# Patient Record
Sex: FEMALE | Race: WHITE | NOT HISPANIC OR LATINO | Employment: FULL TIME | URBAN - METROPOLITAN AREA
[De-identification: names, ages, dates, MRNs, and addresses within clinical notes are randomized per-mention and may not be internally consistent; named-entity substitution may affect disease eponyms.]

---

## 2023-10-10 ENCOUNTER — TRANSFERRED RECORDS (OUTPATIENT)
Dept: HEALTH INFORMATION MANAGEMENT | Facility: CLINIC | Age: 48
End: 2023-10-10

## 2023-10-22 ENCOUNTER — HEALTH MAINTENANCE LETTER (OUTPATIENT)
Age: 48
End: 2023-10-22

## 2023-11-15 ENCOUNTER — TELEPHONE (OUTPATIENT)
Dept: TRANSPLANT | Facility: CLINIC | Age: 48
End: 2023-11-15
Payer: COMMERCIAL

## 2023-11-15 NOTE — TELEPHONE ENCOUNTER
Left Voicemail (1st Attempt) for the patient to call back and schedule the following:    Appointment type: K/P EVAL RTN SURG  Provider: NYA BRANDT  Return date: 1/22/24  Specialty phone number: [phone]  Additional appointment(s) needed: NA  Additonal Notes: can reschedule appt to be with Dr. Brandt on 1/22/24, per RN.

## 2024-01-03 ENCOUNTER — TELEPHONE (OUTPATIENT)
Dept: TRANSPLANT | Facility: CLINIC | Age: 49
End: 2024-01-03
Payer: COMMERCIAL

## 2024-01-18 NOTE — PROGRESS NOTES
Transplant Surgery      Reason For Visit: Kidney transplant mini-eval    History of Present Illness:  Patient diagnosed with ESRD last year.   Unclear what the cause of renal disease as the biopsy results are not readily available.  PD catheter placed last  and started PD Oct 2023.    Non diabetic    BMI last recorded was 56 in records.       Patient has an elevated BMI, has lost 40 lb on ozempic    Exam:   LMP 2024 (Approximate)   Overweight female, no apparent distress    Impression:  Needs weight loss.  Discussed rationale for weight limits and BMI targets in detail with patient and family.  On PD so doesn't need to be listed now.    Plan: Weight loss to BMI < 40 for full eval.  Likely 35 for transplant.  Patient has potential LD.    Ms. Ko appears to be a good candidate for kidney transplantation and has a good understanding of the risks and benefits of this approach to the management of renal failure. The following issues should be addressed prior to finalizing her transplant candidacy:     Weight loss  Complete remainder of workup/eval when achieves BMI ~ 38  Eval donors when patient comes for full eval.    The majority of our visit was spent in counselling, discussing the medical and surgical risks of kidney transplantation. We discussed approximate wait time and how that is influenced by issues such as blood type and sensitization (PRA) and access to a living donor. I contrasted potential waiting time for living vs  donor kidneys from  normal (0-85%) or higher (%) kidney donor profile index (KDPI) donors and their associated outcomes. I would not recommend this individual to consider kidneys from high KDPI donors. Potential surgical complications of kidney transplantation include bleeding, superficial or deep wound complications (infection, hernia, lymphocele), ureteral anastomotic failure (leak or stenosis), graft thrombosis, need for reoperation and other issues such as cardiac  complications, pneumonia, deep venous thrombosis, pulmonary embolism, post transplant diabetes and death. The potential for recurrent disease or need for retransplantation was also addressed. We discussed the possible need for ureteral stent (and subsequent removal), and the utility of protocol biopsy and laboratory studies to evaluate for rejection or recurrent disease. We discussed the risk of graft rejection, our center's average graft and patient survival rates, immunosuppression protocols, as well as the potential opportunity to participate in clinical trials.  We also discussed the average length of stay, recovery process, and posttransplant lab and monitoring protocol.  I emphasized the need for strict immunosuppression medication adherence and the potential for complications of immunosuppression such as skin cancer or lymphoma, as well as a very low but not zero risk of donor-derived disease transmission risks (infection, cancer). Ms. Ko asked good questions and her candidacy will be reviewed at our Multidisciplinary Selection Committee. Thank you for the opportunity to participate in Ms. Ko's care.      Frank Lynn MD, PhD  Transplant Surgery

## 2024-01-22 ENCOUNTER — OFFICE VISIT (OUTPATIENT)
Dept: TRANSPLANT | Facility: CLINIC | Age: 49
End: 2024-01-22
Attending: NURSE PRACTITIONER
Payer: COMMERCIAL

## 2024-01-22 VITALS
HEART RATE: 109 BPM | WEIGHT: 293 LBS | SYSTOLIC BLOOD PRESSURE: 123 MMHG | BODY MASS INDEX: 51.91 KG/M2 | OXYGEN SATURATION: 97 % | DIASTOLIC BLOOD PRESSURE: 76 MMHG | HEIGHT: 63 IN

## 2024-01-22 DIAGNOSIS — N18.5 CHRONIC RENAL FAILURE, STAGE 5 (H): ICD-10-CM

## 2024-01-22 DIAGNOSIS — Z76.82 ORGAN TRANSPLANT CANDIDATE: ICD-10-CM

## 2024-01-22 DIAGNOSIS — I10 ESSENTIAL HYPERTENSION: ICD-10-CM

## 2024-01-22 DIAGNOSIS — Z99.2 STAGE 5 CHRONIC KIDNEY DISEASE ON CHRONIC DIALYSIS (H): ICD-10-CM

## 2024-01-22 DIAGNOSIS — Z01.818 PRE-TRANSPLANT EVALUATION FOR KIDNEY TRANSPLANT: ICD-10-CM

## 2024-01-22 DIAGNOSIS — E78.5 HYPERLIPIDEMIA, UNSPECIFIED HYPERLIPIDEMIA TYPE: ICD-10-CM

## 2024-01-22 DIAGNOSIS — G47.33 OBSTRUCTIVE SLEEP APNEA: ICD-10-CM

## 2024-01-22 DIAGNOSIS — N18.6 END STAGE RENAL DISEASE (H): ICD-10-CM

## 2024-01-22 DIAGNOSIS — N18.9 CHRONIC RENAL FAILURE: ICD-10-CM

## 2024-01-22 DIAGNOSIS — N18.6 STAGE 5 CHRONIC KIDNEY DISEASE ON CHRONIC DIALYSIS (H): ICD-10-CM

## 2024-01-22 DIAGNOSIS — E78.5 HYPERLIPIDEMIA: ICD-10-CM

## 2024-01-22 PROCEDURE — 99213 OFFICE O/P EST LOW 20 MIN: CPT | Performed by: PHYSICIAN ASSISTANT

## 2024-01-22 PROCEDURE — 99202 OFFICE O/P NEW SF 15 MIN: CPT | Performed by: PHYSICIAN ASSISTANT

## 2024-01-22 PROCEDURE — 99204 OFFICE O/P NEW MOD 45 MIN: CPT | Performed by: TRANSPLANT SURGERY

## 2024-01-22 RX ORDER — ASPIRIN 81 MG/1
81 TABLET ORAL DAILY
COMMUNITY

## 2024-01-22 RX ORDER — CARVEDILOL 6.25 MG/1
6.25 TABLET ORAL 2 TIMES DAILY WITH MEALS
COMMUNITY
Start: 2023-09-08

## 2024-01-22 RX ORDER — TORSEMIDE 20 MG/1
20 TABLET ORAL DAILY
COMMUNITY
Start: 2023-09-08

## 2024-01-22 RX ORDER — CALCIUM CARBONATE/VITAMIN D3 500MG-5MCG
1 TABLET ORAL DAILY
COMMUNITY
Start: 2023-09-23

## 2024-01-22 RX ORDER — FOLIC ACID/VIT B COMPLEX AND C 0.8 MG
1 TABLET ORAL
COMMUNITY
Start: 2023-09-08

## 2024-01-22 RX ORDER — ERGOCALCIFEROL 1.25 MG/1
50000 CAPSULE, LIQUID FILLED ORAL WEEKLY
COMMUNITY
Start: 2023-09-22

## 2024-01-22 RX ORDER — PANTOPRAZOLE SODIUM 40 MG/1
40 TABLET, DELAYED RELEASE ORAL DAILY
COMMUNITY
Start: 2023-09-08

## 2024-01-22 RX ORDER — PREDNISONE 20 MG/1
20 TABLET ORAL
COMMUNITY
Start: 2023-12-28

## 2024-01-22 RX ORDER — LEVOTHYROXINE SODIUM 125 UG/1
TABLET ORAL
COMMUNITY
Start: 2023-09-08

## 2024-01-22 NOTE — Clinical Note
RD 1 month video Dr Vasquez in 1-2 months video to discuss sleeve gastrectomy if interested in surgery Keli 3-4 months return MWM

## 2024-01-22 NOTE — LETTER
1/22/2024         RE: Елена Ko  [address] [place] [id number]        Dear Colleague,    Thank you for referring your patient, Елена Ko, to the [facility] TRANSPLANT [facility]. Please see a copy of my visit note below.    [facility] SOLID ORGAN TRANSPLANT  OUTPATIENT MNT: KIDNEY TRANSPLANT EVALUATION    Current BMI: 54 (HT 63 in,  lbs/140 kg)  BMI guideline for kidney transplant up to a BMI of 40 / per surgeon discretion      TIME SPENT: 30 minutes  VISIT TYPE: Initial  REFERRING PHYSICIAN: Abundio Rodriguez DO   PT ACCOMPANIED BY:  and sister-in-law    History of previous txp: No  Dialysis: Yes, PD    NUTRITION ASSESSMENT    This RD visit can be counted as 1st required RD visit for bariatric surgery/weight management if pt were to proceed with this program    - Appetite: Poor, taking Ozempic started in October, was hospital in September, appetite has been poor for a long time. Pt reports she has been losing her hair recently.  - Food allergies/intolerances: None, although reports she doesn't tolerate whey/soy in protein drinks.  - Meal prep & grocery shopping:  does, patient can't go through grocery store d/t fatiguing  - Previous RD education: 15 years ago, for thyroid issues while pregnant  - Issues chewing or swallowing: None  - N/V/D/C: Nausea with sugary foods/beverages, now when she hasn't eaten for a while as well  - Food access concerns: None    Vitamins, Supplements, Pertinent Meds: Nephrovite, Vitamin D  Herbal Medicines/Supplements: None    Edema: Feet, ankles, fluid, right side is worse    Weight hx:   Wt Readings from Last 15 Encounters:   01/22/24 139.9 kg (308 lb 6.4 oz)   10/13/23 147 kg (324 lb 1.2 oz)   5% wt loss in 3 months. Was 154 kg previously, so has lost a total of 14 kg (~31 lbs).    PHYSICAL ACTIVITY  Low energy- anemia, getting hgb back up, gets tired more easily. Works from home, gets up every hour and walks around house a  bit. Also has dogs and walks with them in summer for a couple hours (doesn't do in the winter)     DIET RECALL  Breakfast Skips   Lunch [place] (ham) or chicken tenders (3)   Dinner If she has something, it will be something small usually- bites of the meal her family is having, otherwise skips dinner   Snacks Fruit or vegetables, carrots, potatoes   Beverages Mostly just water, lemonade/crystal light, apple juice, cranberry juice (but juices sometimes causes nausea so hasn't been doing as often)   Alcohol None   Dining out Sometimes, usually takes half home, or kids meal (1/2 of that)     LABS  Na 134 (L, 9/11/23)  K 4.3 (WNL) -> pt reports lately has had low K  BUN 47 (H), Cr 8.2 (H)    NUTRITION DIAGNOSIS  Obesity r/t positive energy balance and inadequate physical activity AEB BMI 54.    NUTRITION INTERVENTION  Nutrition education provided:  - With poor appetite and goal of weight loss, encouraged adequate protein to maintain muscle mass while losing weight.  - Encouraged using a plant-based protein powder in things like oatmeal, smoothies, or mixed with almond milk/water (pt reports not tolerating whey/soy)  - Encouraged adding a couple of high-protein snacks/meals into daily intake. Discussed protein-containing foods for snack ideas.  - Discussed sodium intake (low sodium foods and drinks, seasoning food without salt and tips for low sodium diet). However also discussed that pt is likely not eating too much sodium currently d/t low intakes.    Patient Understanding: Pt verbalized understanding of education provided.  Expected Engagement: Good  Follow-Up Plans: PRN     NUTRITION GOALS  Add in at least 2 high protein snacks or meals/day to maintain muscle mass     Sharmila Vincent, MPS, RD, LD                                    Again, thank you for allowing me to participate in the care of your patient.        Sincerely,        Bambi Vogt RD

## 2024-01-22 NOTE — LETTER
2024         RE: Елена Ko  [address] [place] [id number]        Dear Colleague,    Thank you for referring your patient, Елена Ko, to the [facility] TRANSPLANT [facility]. Please see a copy of my visit note below.      Transplant Surgery      Reason For Visit: Kidney transplant mini-eval    History of Present Illness:  Patient diagnosed with ESRD last year.   Unclear what the cause of renal disease as the biopsy results are not readily available.  PD catheter placed last  and started PD Oct 2023.    Non diabetic    BMI last recorded was 56 in records.       Patient has an elevated BMI, has lost 40 lb on ozempic    Exam:   LMP 2024 (Approximate)   Overweight female, no apparent distress    Impression:  Needs weight loss.  Discussed rationale for weight limits and BMI targets in detail with patient and family.  On PD so doesn't need to be listed now.    Plan: Weight loss to BMI < 40 for full eval.  Likely 35 for transplant.  Patient has potential LD.    Ms. Ko appears to be a good candidate for kidney transplantation and has a good understanding of the risks and benefits of this approach to the management of renal failure. The following issues should be addressed prior to finalizing her transplant candidacy:     Weight loss  Complete remainder of workup/eval when achieves BMI ~ 38  Eval donors when patient comes for full eval.    The majority of our visit was spent in counselling, discussing the medical and surgical risks of kidney transplantation. We discussed approximate wait time and how that is influenced by issues such as blood type and sensitization (PRA) and access to a living donor. I contrasted potential waiting time for living vs  donor kidneys from  normal (0-85%) or higher (%) kidney donor profile index (KDPI) donors and their associated outcomes. I would not recommend this individual to consider kidneys from high KDPI donors. Potential  surgical complications of kidney transplantation include bleeding, superficial or deep wound complications (infection, hernia, lymphocele), ureteral anastomotic failure (leak or stenosis), graft thrombosis, need for reoperation and other issues such as cardiac complications, pneumonia, deep venous thrombosis, pulmonary embolism, post transplant diabetes and death. The potential for recurrent disease or need for retransplantation was also addressed. We discussed the possible need for ureteral stent (and subsequent removal), and the utility of protocol biopsy and laboratory studies to evaluate for rejection or recurrent disease. We discussed the risk of graft rejection, our center's average graft and patient survival rates, immunosuppression protocols, as well as the potential opportunity to participate in clinical trials.  We also discussed the average length of stay, recovery process, and posttransplant lab and monitoring protocol.  I emphasized the need for strict immunosuppression medication adherence and the potential for complications of immunosuppression such as skin cancer or lymphoma, as well as a very low but not zero risk of donor-derived disease transmission risks (infection, cancer). Ms. Ko asked good questions and her candidacy will be reviewed at our Multidisciplinary Selection Committee. Thank you for the opportunity to participate in Ms. Ko's care.      Frank Lynn MD, PhD  Transplant Surgery          Again, thank you for allowing me to participate in the care of your patient.        Sincerely,        Frank Lynn MD

## 2024-01-22 NOTE — PROGRESS NOTES
"20 minutes spent by me on the date of the encounter doing chart review, history and exam, documentation and further activities per the note    New Weight Management Consultation Note    2024    RE: Елена Ko  MR#: [id number]  : 1975      Referring provider:       Chief Complaint/Reason for visit: obesity, weight loss prior to kidney transplant    Dear Saira Long MD (General),    I had the pleasure of seeing your patient, Елена Ko, to evaluate her obesity and discussed weight management treatment options.  As you know, Елена oK is 49 year old.  She has a height of 5' 3.25\", a weight of 308 lbs 6.4 oz, and calculated Body mass index is 54.2 kg/m ..  Full intake/assessment was done to determine barriers to weight loss success and develop a treatment plan. Lost from 338 to 300 lbs. Since August. Taking Ozempic 0.5mg weekly, planning to increase to 1mg if covered in . No Type DM.  May need to switch to Zepbound    Assessment & Plan   Problem List Items Addressed This Visit    None  Visit Diagnoses       Pre-transplant evaluation for kidney transplant        End stage renal disease (H)        Organ transplant candidate        Essential hypertension        Hyperlipidemia, unspecified hyperlipidemia type        Obstructive sleep apnea               Weight mgmt consult  ESRD on dialysis since Aug 2023  Needs BMI <40, ideally less than 35 for kidney transplant.    Lost from 338 to 300 lbs. Since August.   Taking Ozempic 0.5mg weekly, planning to increase to 1mg if covered in . No Type DM.    May need to switch to Zepbound    Follow up   RD first available  Keli 3-4 months      HISTORY OF PRESENT ILLNESS:    ESRD on dialysis since 2023. ESRD came on suddenly.  ? autoimmune cause.  90% scar tissue on biopsy.  Rheumatology: working on autoimmune work up.     Nephrologist: Dr Abundio VACA  Dialysis at [facility]. PD since Oct.     Diabetes: No " diabetes    GERD: Protonix     Interested in weight loss surgery: Possibly    CO-MORBIDITIES OF OBESITY INCLUDE:    Is patient on biologics or immunomodulators? No    PAST MEDICAL HISTORY:  Past Medical History:   Diagnosis Date    Arthritis     Hypertension     Thyroid disease        PAST SURGICAL HISTORY:  Past Surgical History:   Procedure Laterality Date    BIOPSY      Kidney August 2023    ENT SURGERY      deviated septum 2013    GYN SURGERY      D&C x 2    ORTHOPEDIC SURGERY      right ankle surgery 2013       SOCIAL HISTORY:     Works as , 4 teenagers    HABITS:  No alcohol  No smoking  Currently taking narcotic/opioids No    PSYCHOLOGICAL HISTORY:   No past psych history      EATING BEHAVIORS:  No dx of ED    EXERCISE:  Walking outside    MEDICATIONS:  Current Outpatient Medications   Medication Sig Dispense Refill    aspirin 81 MG EC tablet Take 81 mg by mouth daily      Calcium Carb-Cholecalciferol (OYSCO 500 + D) 500-5 MG-MCG TABS Take 1 tablet by mouth daily      carvedilol (COREG) 6.25 MG tablet Take 6.25 mg by mouth 2 times daily (with meals)      Fexofenadine HCl (ALLEGRA ALLERGY PO)       levothyroxine (SYNTHROID/LEVOTHROID) 125 MCG tablet levothyroxine 125 mcg      multivitamin RENAL (NEPHROVITE) 0.8 MG tablet Take 1 tablet by mouth      NIFEdipine ER (ADALAT CC) 60 MG 24 hr tablet Take 60 mg by mouth daily      pantoprazole (PROTONIX) 40 MG EC tablet Take 40 mg by mouth daily      predniSONE (DELTASONE) 20 MG tablet Take 20 mg by mouth once as needed      Semaglutide, 1 MG/DOSE, (OZEMPIC) 4 MG/3ML pen Inject 1 mg Subcutaneous every 7 days      torsemide (DEMADEX) 20 MG tablet Take 20 mg by mouth daily      vitamin D2 (ERGOCALCIFEROL) 00386 units (1250 mcg) capsule Take 50,000 Units by mouth once a week         ALLERGIES:    PHYSICAL EXAM:  Objective    Physical Exam   GENERAL: alert and no distress  EYES: Eyes grossly normal to inspection.  No discharge or erythema, or obvious  scleral/conjunctival abnormalities.  RESP: No audible wheeze, cough, or visible cyanosis.    SKIN: Visible skin clear. No significant rash, abnormal pigmentation or lesions.  NEURO: Cranial nerves grossly intact.  Mentation and speech appropriate for age.  PSYCH: Appropriate affect, tone, and pace of words        Sincerely,     Keli Gibson PA-C

## 2024-01-22 NOTE — LETTER
"    2024         RE: Елена Ko  [address] [place] [id number]        Dear Colleague,    Thank you for referring your patient, Елена Ko, to the [facility] TRANSPLANT [facility]INIC. Please see a copy of my visit note below.    20 minutes spent by me on the date of the encounter doing chart review, history and exam, documentation and further activities per the note    New Weight Management Consultation Note    2024    RE: Елена Ko  MR#: [id number]  : 1975      Referring provider:       Chief Complaint/Reason for visit: obesity, weight loss prior to kidney transplant    Dear Saira Long MD (General),    I had the pleasure of seeing your patient, Елена Ko, to evaluate her obesity and discussed weight management treatment options.  As you know, Елена Ko is 49 year old.  She has a height of 5' 3.25\", a weight of 308 lbs 6.4 oz, and calculated Body mass index is 54.2 kg/m ..  Full intake/assessment was done to determine barriers to weight loss success and develop a treatment plan. Lost from 338 to 300 lbs. Since August. Taking Ozempic 0.5mg weekly, planning to increase to 1mg if covered in . No Type DM.  May need to switch to Zepbound    Assessment & Plan  Problem List Items Addressed This Visit    None  Visit Diagnoses       Pre-transplant evaluation for kidney transplant        End stage renal disease (H)        Organ transplant candidate        Essential hypertension        Hyperlipidemia, unspecified hyperlipidemia type        Obstructive sleep apnea               Weight mgmt consult  ESRD on dialysis since Aug 2023  Needs BMI <40, ideally less than 35 for kidney transplant.    Lost from 338 to 300 lbs. Since August.   Taking Ozempic 0.5mg weekly, planning to increase to 1mg if covered in . No Type DM.    May need to switch to Zepbound    Follow up   RD first available  Keli 3-4 months      HISTORY OF PRESENT ILLNESS:    ESRD on " dialysis since August 30th 2023. ESRD came on suddenly.  ? autoimmune cause.  90% scar tissue on biopsy.  Rheumatology: working on autoimmune work up.     Nephrologist: Dr Abundio VACA  Dialysis at [facility]. PD since Oct.     Diabetes: No diabetes    GERD: Protonix     Interested in weight loss surgery: Possibly    CO-MORBIDITIES OF OBESITY INCLUDE:    Is patient on biologics or immunomodulators? No    PAST MEDICAL HISTORY:  Past Medical History:   Diagnosis Date     Arthritis      Hypertension      Thyroid disease        PAST SURGICAL HISTORY:  Past Surgical History:   Procedure Laterality Date     BIOPSY      Kidney August 2023     ENT SURGERY      deviated septum 2013     GYN SURGERY      D&C x 2     ORTHOPEDIC SURGERY      right ankle surgery 2013       SOCIAL HISTORY:     Works as , 4 teenagers    HABITS:  No alcohol  No smoking  Currently taking narcotic/opioids No    PSYCHOLOGICAL HISTORY:   No past psych history      EATING BEHAVIORS:  No dx of ED    EXERCISE:  Walking outside    MEDICATIONS:  Current Outpatient Medications   Medication Sig Dispense Refill     aspirin 81 MG EC tablet Take 81 mg by mouth daily       Calcium Carb-Cholecalciferol (OYSCO 500 + D) 500-5 MG-MCG TABS Take 1 tablet by mouth daily       carvedilol (COREG) 6.25 MG tablet Take 6.25 mg by mouth 2 times daily (with meals)       Fexofenadine HCl (ALLEGRA ALLERGY PO)        levothyroxine (SYNTHROID/LEVOTHROID) 125 MCG tablet levothyroxine 125 mcg       multivitamin RENAL (NEPHROVITE) 0.8 MG tablet Take 1 tablet by mouth       NIFEdipine ER (ADALAT CC) 60 MG 24 hr tablet Take 60 mg by mouth daily       pantoprazole (PROTONIX) 40 MG EC tablet Take 40 mg by mouth daily       predniSONE (DELTASONE) 20 MG tablet Take 20 mg by mouth once as needed       Semaglutide, 1 MG/DOSE, (OZEMPIC) 4 MG/3ML pen Inject 1 mg Subcutaneous every 7 days       torsemide (DEMADEX) 20 MG tablet Take 20 mg by mouth daily        vitamin D2 (ERGOCALCIFEROL) 15680 units (1250 mcg) capsule Take 50,000 Units by mouth once a week         ALLERGIES:    PHYSICAL EXAM:  Objective   Physical Exam   GENERAL: alert and no distress  EYES: Eyes grossly normal to inspection.  No discharge or erythema, or obvious scleral/conjunctival abnormalities.  RESP: No audible wheeze, cough, or visible cyanosis.    SKIN: Visible skin clear. No significant rash, abnormal pigmentation or lesions.  NEURO: Cranial nerves grossly intact.  Mentation and speech appropriate for age.  PSYCH: Appropriate affect, tone, and pace of words        Sincerely,     Keli Gibson PA-C            Again, thank you for allowing me to participate in the care of your patient.        Sincerely,        Keli Gibson PA-C

## 2024-01-22 NOTE — PROGRESS NOTES
[facility] SOLID ORGAN TRANSPLANT  OUTPATIENT MNT: KIDNEY TRANSPLANT EVALUATION    Current BMI: 54 (HT 63 in,  lbs/140 kg)  BMI guideline for kidney transplant up to a BMI of 40 / per surgeon discretion      TIME SPENT: 30 minutes  VISIT TYPE: Initial  REFERRING PHYSICIAN: Abundio Rodriguez DO   PT ACCOMPANIED BY:  and sister-in-law    History of previous txp: No  Dialysis: Yes, PD    NUTRITION ASSESSMENT    This RD visit can be counted as 1st required RD visit for bariatric surgery/weight management if pt were to proceed with this program    - Appetite: Poor, taking Ozempic started in October, was hospital in September, appetite has been poor for a long time. Pt reports she has been losing her hair recently.  - Food allergies/intolerances: None, although reports she doesn't tolerate whey/soy in protein drinks.  - Meal prep & grocery shopping:  does, patient can't go through grocery store d/t fatiguing  - Previous RD education: 15 years ago, for thyroid issues while pregnant  - Issues chewing or swallowing: None  - N/V/D/C: Nausea with sugary foods/beverages, now when she hasn't eaten for a while as well  - Food access concerns: None    Vitamins, Supplements, Pertinent Meds: Nephrovite, Vitamin D  Herbal Medicines/Supplements: None    Edema: Feet, ankles, fluid, right side is worse    Weight hx:   Wt Readings from Last 15 Encounters:   01/22/24 139.9 kg (308 lb 6.4 oz)   10/13/23 147 kg (324 lb 1.2 oz)   5% wt loss in 3 months. Was 154 kg previously, so has lost a total of 14 kg (~31 lbs).    PHYSICAL ACTIVITY  Low energy- anemia, getting hgb back up, gets tired more easily. Works from home, gets up every hour and walks around house a bit. Also has dogs and walks with them in summer for a couple hours (doesn't do in the winter)     DIET RECALL  Breakfast Skips   Lunch [place] (ham) or chicken tenders (3)   Dinner If she has something, it will be something small usually- bites of the meal her  family is having, otherwise skips dinner   Snacks Fruit or vegetables, carrots, potatoes   Beverages Mostly just water, lemonade/crystal light, apple juice, cranberry juice (but juices sometimes causes nausea so hasn't been doing as often)   Alcohol None   Dining out Sometimes, usually takes half home, or kids meal (1/2 of that)     LABS  Na 134 (L, 9/11/23)  K 4.3 (WNL) -> pt reports lately has had low K  BUN 47 (H), Cr 8.2 (H)    NUTRITION DIAGNOSIS  Obesity r/t positive energy balance and inadequate physical activity AEB BMI 54.    NUTRITION INTERVENTION  Nutrition education provided:  - With poor appetite and goal of weight loss, encouraged adequate protein to maintain muscle mass while losing weight.  - Encouraged using a plant-based protein powder in things like oatmeal, smoothies, or mixed with almond milk/water (pt reports not tolerating whey/soy)  - Encouraged adding a couple of high-protein snacks/meals into daily intake. Discussed protein-containing foods for snack ideas.  - Discussed sodium intake (low sodium foods and drinks, seasoning food without salt and tips for low sodium diet). However also discussed that pt is likely not eating too much sodium currently d/t low intakes.    Patient Understanding: Pt verbalized understanding of education provided.  Expected Engagement: Good  Follow-Up Plans: PRN     NUTRITION GOALS  Add in at least 2 high protein snacks or meals/day to maintain muscle mass     Sharmila Vincent, SHERLY, RD, LD

## 2024-01-25 ENCOUNTER — TELEPHONE (OUTPATIENT)
Dept: SURGERY | Facility: CLINIC | Age: 49
End: 2024-01-25
Payer: COMMERCIAL

## 2024-02-16 ENCOUNTER — VIRTUAL VISIT (OUTPATIENT)
Dept: ENDOCRINOLOGY | Facility: CLINIC | Age: 49
End: 2024-02-16
Payer: COMMERCIAL

## 2024-02-16 VITALS — WEIGHT: 293 LBS | HEIGHT: 64 IN | BODY MASS INDEX: 50.02 KG/M2

## 2024-02-16 DIAGNOSIS — Z99.2 END STAGE RENAL FAILURE ON DIALYSIS (H): ICD-10-CM

## 2024-02-16 DIAGNOSIS — Z71.3 NUTRITIONAL COUNSELING: Primary | ICD-10-CM

## 2024-02-16 DIAGNOSIS — N18.6 END STAGE RENAL FAILURE ON DIALYSIS (H): ICD-10-CM

## 2024-02-16 DIAGNOSIS — E66.9 OBESITY: ICD-10-CM

## 2024-02-16 PROCEDURE — 97802 MEDICAL NUTRITION INDIV IN: CPT | Mod: 95

## 2024-02-16 PROCEDURE — 99207 PR NO CHARGE LOS: CPT | Mod: 95

## 2024-02-16 NOTE — PATIENT INSTRUCTIONS
GOALS:  Relating To Eating:  - Eat slowly (20-30 minutes per meal), chewing foods well (25 chews per bite/applesauce consistency)  - Focus on eating smaller portion sizes at meals and snacks    Relating to beverages:  - Reduce caffeine/carbonation/calorie containing beverages  - Separate fluids from meals by 30 minutes before, during, and after eating  - Drink 48-64 ounces of fluid per day. Small, frequent sips between meals.    Relating to activity:  Increase activity as able      Protein Sources for Weight Loss  [email or web address]     Carbohydrates  [email or web address]     Mindful Eating  [email or web address]     Post-op Diet Handouts:  Diet Guidelines after Weight-loss Surgery  [email or web address]     Your Stage 1 Diet: Clear Liquids  [email or web address]     Your Stage 2 Diet: Low-fat Full Liquids  [email or web address]     Your Stage 3 Diet: Pureed Foods  [email or web address]     Pureed Recipes  [email or web address]    Your Stage 4 Diet: Soft Foods  [email or web address]    Your Stage 5 Diet: Regular Foods  [email or web address]    Supplements after Sleeve Gastrectomy, Gastric Bypass or Single Anastomosis Duodenal Switch  [email or web address]    Keeping Track of Fluids  [email or web address]      Follow up: 3/15/24 at 10:30 am with Nereida Barger RD, LD    Radha Murray (Duncan), SETH, RD, LD  Clinic #: [phone]

## 2024-02-16 NOTE — PROGRESS NOTES
"Video-Visit Details    Type of service:  Video Visit    Video Start Time: 11:05 am   Video End Time: 11:55 am    Originating Location (pt. Location): Home    Distant Location (provider location):  Offsite (providers home)     Platform used for Video Visit: Other: Zoom    New Bariatric Nutrition Class Note    Reason For Visit: Nutrition Class     Елена Ko is a 49 year old presenting today for virtual bariatric nutrition class and consultation.  Pt is interested in weight loss surgery.     Pt referred by JAVED Singleton on January 22nd, 2024.    CO-MORBIDITIES OF OBESITY INCLUDE:         No data to display                SUPPORT:       No data to display                ANTHROPOMETRICS:  Estimated body mass index is 51.89 kg/m  as calculated from the following:    Height as of this encounter: 1.613 m (5' 3.5\").    Weight as of this encounter: 135 kg (297 lb 9.9 oz).         No data to display                     No data to display                  NUTRITION HISTORY:  Lactose intolerance  Possible soy intolerance/allergy per pt  Does not tolerate artificial sweeteners well since starting PD - taste chemical like per pt    Patient attended virtual WLS nutrition class today via Zoom. She was the only participant, therefore were were able to talk more 1:1 specific to patient's case.     Pt reports a dx of kidney failure in September 2023. Currently on PD and considering surgery as an option to get on transplant list.     Not on a strict fluid restriction but tries to limit to 1.5-2L per her teams recommendation.     Does not drink carbonated beverages - taste funny since starting dialysis          No data to display                     No data to display                     No data to display                     No data to display                EXERCISE:         No data to display                NUTRITION DIAGNOSIS:  Obesity r/t long history of positive energy balance aeb BMI >30 kg/m2.    INTERVENTION:  Patient " attended New WLS Nutrition Class today.    Intervention Provided/Education Provided on post-op diet guidelines, vitamins/minerals essential post-operatively, GI anatomy of bariatric surgeries, ways to help prepare for post-op diet guidelines pre-operatively, portion/calorie-control, mindful eating and sources of protein. Provided pt with list of goals and handouts below via OrganizedWisdom. Presentation slides provided via email.           No data to display                Expected Engagement: good    GOALS:  Relating To Eating:  - Eat slowly (20-30 minutes per meal), chewing foods well (25 chews per bite/applesauce consistency)  - Focus on eating smaller portion sizes at meals and snacks    Relating to beverages:  - Reduce caffeine/carbonation/calorie containing beverages  - Separate fluids from meals by 30 minutes before, during, and after eating  - Drink 48-64 ounces of fluid per day. Small, frequent sips between meals.    Relating to activity:  Increase activity as able      Protein Sources for Weight Loss  [email or web address]     Carbohydrates  [email or web address]     Mindful Eating  [email or web address]     Post-op Diet Handouts:  Diet Guidelines after Weight-loss Surgery  [email or web address]     Your Stage 1 Diet: Clear Liquids  [email or web address]     Your Stage 2 Diet: Low-fat Full Liquids  [email or web address]     Your Stage 3 Diet: Pureed Foods  [email or web address]     Pureed Recipes  [email or web address]    Your Stage 4 Diet: Soft Foods  [email or web address]    Your Stage 5 Diet: Regular Foods  [email or web address]    Supplements after Sleeve Gastrectomy, Gastric Bypass or Single Anastomosis Duodenal Switch  [email or web address]    Keeping Track of Fluids  [email or web address]      Follow up: 3/15/24 at 10:30 am with Nereida Barger RD, LD    Time spent with patient: 50 minutes.  Radha Mejia  MPP-D, RD, LD    *Pt was only participant in class today, therefore billed as MNT initial vs group.

## 2024-03-10 ENCOUNTER — HEALTH MAINTENANCE LETTER (OUTPATIENT)
Age: 49
End: 2024-03-10

## 2024-03-14 NOTE — PROGRESS NOTES
"Video-Visit Details    Type of service:  Video Visit    Video Start Time: 10:28 AM   Video End Time: 10:58 AM     Originating Location (pt. Location): Home    Distant Location (provider location): Offsite (providers home) [facility] [place]     Platform used for Video Visit: Dinora    New Bariatric Nutrition Consultation Note    Reason For Visit: Nutrition Assessment    Елена Ko is a 49 year old presenting today for new bariatric nutrition consult.   Patient is interested in possible weight loss surgery. Patient is accompanied by self.  This is patient's 3rd nutrition visit prior to surgery. Saw transplant dietitian on 1/22/24 and completed the WLS nutrition class on 2/16/24.     Pt referred by JAVED Singleton  on January 22, 2024.     CO-MORBIDITIES OF OBESITY INCLUDE:  ESRD on dialysis since August 30, 2023, GERD, HTN    SUPPORT:       No data to display              ANTHROPOMETRICS:    Initial consult weight: 308 lb on 1/22/24   Estimated body mass index is 51.51 kg/m  as calculated from the following:    Height as of this encounter: 1.613 m (5' 3.5\").    Weight as of this encounter: 134 kg (295 lb 6.7 oz).  Current Weight: 295 lb per pt report     Needs BMI <40, ideally less than 35 for kidney transplant     MEDICATION FOR WEIGHT LOSS: Ozempic 1 mg - discontinued     VITAMIN/MINERAL SUPPLEMENTS: Nephrovite, Vitamin D, Tums     Is on peritoneal dialysis. Follows the normal dialysis diet recommendations. Potassium level actually runs low often. On a 2 liter fluid restrictions.     NUTRITION HISTORY:  Food allergies: NKFA   Food intolerances: Lactose intolerant - tries to limit, soy products   Barriers to lifestyle change: hard to plan food around busy life and activities    Works from home   Feels too tired to cook at times but this is getting better. Really trying to listen to her body when she is full to stop eating.     Starts her peritoneal dialysis around 5 or 6 PM " - 9 hrs in total.     Has been tracking her nutrition in an phill [facility] [employer] [facility].     Recent Diet Recall:  Breakfast: skips, not hungry, has never been a breakfast person.   Lunch: 10-11 AM, quick easy meals, leftovers or sandwich   Snack - grapes, cheese   Dinner: popcorn   Beverages: water is main source of hydration, glass of orange juice in the morning. Does not like soda anymore d/t it tasting strange.     EXERCISE: Low energy, gets tired easily. Works from home and gets up every few hours to walk around the house.      ADDITIONAL INFORMATION:  Works as a , has 4 teenage kids.     NUTRITION DIAGNOSIS:  Obesity r/t long history of positive energy balance aeb BMI >30 kg/m2.    INTERVENTION:  Intervention Provided/Education Provided on post-op diet guidelines, vitamins/minerals essential post-operatively, GI anatomy of bariatric surgeries, ways to help prepare for post-op diet guidelines pre-operatively, portion/calorie-control, mindful eating and sources of protein.  Patient demonstrates understanding.     Personal barriers to making and continuing required life changes have been identified, and strategies to overcome those barriers have been recommended AND family and social supports have been assessed and strategies to strengthen those supports have been recommended.    Provided pt with list of goals, RD contact information and resources listed below via Verisimt.       Expected Engagement: good    GOALS:  Relating To Eating:  Eat slowly (20-30 minutes per meal), chewing foods well (25 chews per bite/applesauce consistency)  Eat 3 meals per day or develop more of a consistent routine earlier on in the day.   Consume 60-90 g protein per day    Relating to beverages:  Reduce caffeine/carbonation/calorie containing beverages  Separate fluids from meals by 30 minutes before, during, and after eating  Drink 48-64 ounces of fluid per day    Relating to dietary supplements:  Continue with current supplement  "routine.     Relating to activity:  Increase activity as able    The Plate Method  [email or web address]    Protein Sources for Weight Loss  [email or web address]     Quick/Easy Protein Sources:  Hard boiled eggs  Part-skim cheese sticks  Baby Bell cheese rounds  Low-fat/low-sugar Greek yogurt  Low-fat cottage cheese  Lean deli meat (chicken/turkey/ham)  Roasted chickpeas or lentils  Nuts   Turkey meat stick  Protein shake/bar  \"P3\" snack (cheese, nuts, deli meat)  Aldi's \"Protein Bread\"   \"Egglife\" egg white wrap    Tuna/salmon can/packet     Carbohydrates  [email or web address]     Mindful Eating  [email or web address]     Summary of Volumetrics Eating Plan  [email or web address]     Diet Guidelines after Weight Loss Surgery  [email or web address]     Seated Exercises for Arms and Legs (can be done before or after surgery)  [email or web address]    Follow Up: April 19.     Time spent with patient: 23 minutes.  Veronica Barger RD, LD    "

## 2024-03-15 ENCOUNTER — VIRTUAL VISIT (OUTPATIENT)
Dept: ENDOCRINOLOGY | Facility: CLINIC | Age: 49
End: 2024-03-15
Payer: COMMERCIAL

## 2024-03-15 VITALS — HEIGHT: 64 IN | WEIGHT: 293 LBS | BODY MASS INDEX: 50.02 KG/M2

## 2024-03-15 DIAGNOSIS — N18.6 END STAGE RENAL FAILURE ON DIALYSIS (H): ICD-10-CM

## 2024-03-15 DIAGNOSIS — Z71.3 NUTRITIONAL COUNSELING: Primary | ICD-10-CM

## 2024-03-15 DIAGNOSIS — Z99.2 END STAGE RENAL FAILURE ON DIALYSIS (H): ICD-10-CM

## 2024-03-15 DIAGNOSIS — E66.9 OBESITY: ICD-10-CM

## 2024-03-15 PROCEDURE — 99207 PR NO CHARGE LOS: CPT | Mod: 95

## 2024-03-15 PROCEDURE — 97802 MEDICAL NUTRITION INDIV IN: CPT | Mod: 95

## 2024-03-15 ASSESSMENT — PAIN SCALES - GENERAL: PAINLEVEL: MILD PAIN (3)

## 2024-03-15 NOTE — PATIENT INSTRUCTIONS
"Henri Christiansen,     Follow-up with RD on April 19.     Thank you,    Veronica Barger, RD, LD  If you would like to schedule or reschedule an appointment with the RD, please call [phone]    Nutrition Goals  Relating To Eating:  Eat slowly (20-30 minutes per meal), chewing foods well (25 chews per bite/applesauce consistency)  Eat 3 meals per day or develop more of a consistent routine earlier on in the day.   Consume 60-90 g protein per day    Relating to beverages:  Reduce caffeine/carbonation/calorie containing beverages  Separate fluids from meals by 30 minutes before, during, and after eating  Drink 48-64 ounces of fluid per day    Relating to dietary supplements:  Continue with current supplement routine.     Relating to activity:  Increase activity as able    The Plate Method  [email or web address]    Protein Sources for Weight Loss  [email or web address]     Quick/Easy Protein Sources:  Hard boiled eggs  Part-skim cheese sticks  Baby Bell cheese rounds  Low-fat/low-sugar Greek yogurt  Low-fat cottage cheese  Lean deli meat (chicken/turkey/ham)  Roasted chickpeas or lentils  Nuts   Turkey meat stick  Protein shake/bar  \"P3\" snack (cheese, nuts, deli meat)  Aldi's \"Protein Bread\"   \"Egglife\" egg white wrap    Tuna/salmon can/packet     Carbohydrates  [email or web address]     Mindful Eating  [email or web address]     Summary of Volumetrics Eating Plan  [email or web address]     Diet Guidelines after Weight Loss Surgery  [email or web address]     Seated Exercises for Arms and Legs (can be done before or after surgery)  [email or web address]    COMPREHENSIVE WEIGHT MANAGEMENT PROGRAM  VIRTUAL SUPPORT GROUPS    At [facility], our Comprehensive Weight Management program offers on-line support groups for patients who are working on weight loss and considering, preparing for, or have had weight loss surgery.     There is no cost for this " opportunity.  You are invited to attend the?Virtual Support Groups?provided by any of the following locations:    [facility] via Microsoft Teams with Joan Leyva RN  2.   [place] via EzLike with Dio Vidales, PhD, LP  3.   [place] via EzLike with Neela Lopez RN  4.   [facility] via a Zoom Meeting with Neela Xavier [facility]-    The following Support Group information can also be found on our website:  [email or web address]      [facility] Weight Loss Surgery Support Group  The support group is a patient-lead forum that meets monthly to share experiences, encouragement and education. It is open to those who have had weight loss surgery, are scheduled for surgery, or are considering surgery.   WHEN: This group meets on the 3rd Wednesday of each month from 5:00PM - 6:00PM virtually using Microsoft Teams.   FACILITATOR: Led by Joan Parmar RD, LD, RN, the program's Clinical Coordinator.   TO REGISTER: Please contact the clinic via Koko or call the nurse line directly at [phone] to inform our staff that you would like an invite sent to you and to let us know the email you would like the invite sent to. Prior to the meeting, a link with directions on how to join the meeting will be sent to you.    2024 Meetings   January 17  February 21  March 20  April 17  May 15  Li 19      [facility] and [facility] Bariatric Care Support Group?  This is open to all pre- and post- operative bariatric surgery patients as well as their support system.   WHEN: This group meets the 3rd Tuesday of each month from 6:30 PM - 8:00 PM virtually using Microsoft Teams.   FACILITATOR: Led by Dio Vidales, Ph.D who is a Licensed Psychologist with the [facility] Comprehensive Weight Management Program.   TO REGISTER: Please send an email to Dio Vidales, Ph.D.,  "LP at?[email or web address]?if you would like an invitation to the group. Prior to the meeting, a link with directions on how to join the meeting will be sent to you.    2024 Meetings January 16: \"Medication Management and Bariatric Surgery\", Elvira Everett, PharmD, Pharmacy Resident at [facility]  February 20: \"A Bariatric Surgery Patient's Perspective\", CHANEL Reyes, [facility], Behavioral Health Clinician at [facility]  March 19  April 16  May 21  Li 18: \"Nutritional Labeling\", Dietitian speaker to be announced.  November 19: \"Holiday Eating\", Dietitian speaker to be announced.    [facility] and [facility] Post-Operative Bariatric Surgery Support Group  This is a support group for [facility] bariatric patients (and those external to [facility]) who have had bariatric surgery and are at least 3 months post-surgery.  WHEN: This support group meets the 4th Wednesday of the month from 11:00 AM - 12:00 PM virtually using Microsoft Teams.   FACILITATOR: Led by Certified Bariatric Nurse, Neela Lopez RN.   TO REGISTER: Please send an email to Neela at [email or web address] if you would like an invitation to the group.  Prior to the meeting, a link with directions on how to join the meeting will be sent to you.    2024 Meetings  January 24  February 28  March 27  April 24  May 22  Li 26    [facility] Healthy Lifestyle Group?  This is a 60 minute virtual coaching group for those who want to lead a healthier lifestyle. Come together to set goals and overcome barriers in a supportive group environment. We will address the four pillars of health: nutrition, exercise, sleep and emotional well-being.  This group is highly recommended for those who are participating in the 24 week Healthy Lifestyle Plan and our Health Coaching sessions.  WHEN: This group meets the 1st " "Friday of the month, 12:30 PM - 1:30 PM online, via a zoom meeting.    FACILITATOR: Led by National Board Certified Health and , Neela Xavier, [facility].   TO REGISTER: Please call the Call Center at [phone] to register. You will get an appointment to attend in [facility]. Fifteen minutes prior to the meeting, complete the e-check in and you will get the link to join the meeting.    There is no charge to attend this group and space is limited.     2024 Meetings  January 5: \"New Years Vision: Manifest your Best 2024!\" (guided imagery,  journaling and discussion)  February 2: \"Let's Talk\"  March 1: \"10 Percent Happier\" by Bhavik Prakash (Book Bites - a guided discussion on the nuggets of wisdom from favorite wellness books, no need to read the book but highly encouraged)  April 5: \"Let's Talk\"  May 3: \"Essentialism: The Disciplined Pursuit of Less\" by Kody Mcdermott (Book Bites discussion)  June 7: \"Let's Talk\"  July 5: NO MEETING, off for the 4th of July Holiday  August 2: \"The Blue Zones, Secrets for Living a Longer Life\" by Bhavik Schroeder (Book Bites discussion)        "

## 2024-03-15 NOTE — NURSING NOTE
Is the patient currently in the state of [place]? YES    Visit mode:VIDEO    If the visit is dropped, the patient can be reconnected by: VIDEO VISIT: Text to cell phone:   Telephone Information:   Mobile [phone]       Will anyone else be joining the visit? NO  (If patient encounters technical issues they should call [phone]    How would you like to obtain your AVS? MyChart    Are changes needed to the allergy or medication list? N/A    Reason for visit: MESHA THOMAS

## 2024-03-15 NOTE — LETTER
"3/15/2024       RE: Елена Ko  [address] [place] [id number]     Dear Colleague,    Thank you for referring your patient, Елена Ko, to the [facility] [place] at [facility]. Please see a copy of my visit note below.    Video-Visit Details    Type of service:  Video Visit    Video Start Time: 10:28 AM   Video End Time: 10:58 AM     Originating Location (pt. Location): Home    Distant Location (provider location): Offsite (providers home) [facility] [place]     Platform used for Video Visit: Illumagear    New Bariatric Nutrition Consultation Note    Reason For Visit: Nutrition Assessment    Елена Ko is a 49 year old presenting today for new bariatric nutrition consult.   Patient is interested in possible weight loss surgery. Patient is accompanied by self.  This is patient's 3rd nutrition visit prior to surgery. Saw transplant dietitian on 1/22/24 and completed the WLS nutrition class on 2/16/24.     Pt referred by JAVED Singleton  on January 22, 2024.     CO-MORBIDITIES OF OBESITY INCLUDE:  ESRD on dialysis since August 30, 2023, GERD, HTN    SUPPORT:       No data to display              ANTHROPOMETRICS:    Initial consult weight: 308 lb on 1/22/24   Estimated body mass index is 51.51 kg/m  as calculated from the following:    Height as of this encounter: 1.613 m (5' 3.5\").    Weight as of this encounter: 134 kg (295 lb 6.7 oz).  Current Weight: 295 lb per pt report     Needs BMI <40, ideally less than 35 for kidney transplant     MEDICATION FOR WEIGHT LOSS: Ozempic 1 mg - discontinued     VITAMIN/MINERAL SUPPLEMENTS: Nephrovite, Vitamin D, Tums     Is on peritoneal dialysis. Follows the normal dialysis diet recommendations. Potassium level actually runs low often. On a 2 liter fluid restrictions.     NUTRITION HISTORY:  Food allergies: NKFA   Food intolerances: " Lactose intolerant - tries to limit, soy products   Barriers to lifestyle change: hard to plan food around busy life and activities    Works from home   Feels too tired to cook at times but this is getting better. Really trying to listen to her body when she is full to stop eating.     Starts her peritoneal dialysis around 5 or 6 PM - 9 hrs in total.     Has been tracking her nutrition in an phill [employer].     Recent Diet Recall:  Breakfast: skips, not hungry, has never been a breakfast person.   Lunch: 10-11 AM, quick easy meals, leftovers or sandwich   Snack - grapes, cheese   Dinner: popcorn   Beverages: water is main source of hydration, glass of orange juice in the morning. Does not like soda anymore d/t it tasting strange.     EXERCISE: Low energy, gets tired easily. Works from home and gets up every few hours to walk around the house.      ADDITIONAL INFORMATION:  Works as a , has 4 teenage kids.     NUTRITION DIAGNOSIS:  Obesity r/t long history of positive energy balance aeb BMI >30 kg/m2.    INTERVENTION:  Intervention Provided/Education Provided on post-op diet guidelines, vitamins/minerals essential post-operatively, GI anatomy of bariatric surgeries, ways to help prepare for post-op diet guidelines pre-operatively, portion/calorie-control, mindful eating and sources of protein.  Patient demonstrates understanding.     Personal barriers to making and continuing required life changes have been identified, and strategies to overcome those barriers have been recommended AND family and social supports have been assessed and strategies to strengthen those supports have been recommended.    Provided pt with list of goals, RD contact information and resources listed below via [employer]CR [employer].       Expected Engagement: good    GOALS:  Relating To Eating:  Eat slowly (20-30 minutes per meal), chewing foods well (25 chews per bite/applesauce consistency)  Eat 3 meals per day or develop more of a consistent  "routine earlier on in the day.   Consume 60-90 g protein per day    Relating to beverages:  Reduce caffeine/carbonation/calorie containing beverages  Separate fluids from meals by 30 minutes before, during, and after eating  Drink 48-64 ounces of fluid per day    Relating to dietary supplements:  Continue with current supplement routine.     Relating to activity:  Increase activity as able    The Plate Method  [email or web address]    Protein Sources for Weight Loss  [email or web address]     Quick/Easy Protein Sources:  Hard boiled eggs  Part-skim cheese sticks  Baby Bell cheese rounds  Low-fat/low-sugar Greek yogurt  Low-fat cottage cheese  Lean deli meat (chicken/turkey/ham)  Roasted chickpeas or lentils  Nuts   Turkey meat stick  Protein shake/bar  \"P3\" snack (cheese, nuts, deli meat)  Aldi's \"Protein Bread\"   \"Egglife\" egg white wrap    Tuna/salmon can/packet     Carbohydrates  [email or web address]     Mindful Eating  [email or web address]     Summary of Volumetrics Eating Plan  [email or web address]     Diet Guidelines after Weight Loss Surgery  [email or web address]     Seated Exercises for Arms and Legs (can be done before or after surgery)  [email or web address]    Follow Up: April 19.     Time spent with patient: 23 minutes.  Veronica Barger RD, LD    "

## 2024-04-18 NOTE — PROGRESS NOTES
"Video-Visit Details    Type of service:  Video Visit    Video Start Time: 10:28 AM  Video End Time: 10:45 AM     Originating Location (pt. Location): Home    Distant Location (provider location): Offsite (providers home) [facility] [place]     [facility]    Return Bariatric Nutrition Consultation Note    Reason For Visit: Nutrition Assessment    Елена Ko is a 49 year old presenting today for return bariatric nutrition consult.   Patient is interested in possible weight loss surgery. Patient is accompanied by self.  This is patient's 4th nutrition visit prior to surgery. Saw transplant dietitian on 1/22/24 and completed the WLS nutrition class on 2/16/24.     Pt referred by JAVED Singleton  on January 22, 2024.     CO-MORBIDITIES OF OBESITY INCLUDE:  ESRD on dialysis since August 30, 2023, GERD, HTN    SUPPORT:       No data to display              ANTHROPOMETRICS:    Initial consult weight: 308 lb on 1/22/24   Estimated body mass index is 51.94 kg/m  as calculated from the following:    Height as of this encounter: 1.6 m (5' 3\").    Weight as of this encounter: 133 kg (293 lb 3.4 oz).  Current Weight: 293 lb per pt report    Needs BMI <40, ideally less than 35 for kidney transplant     MEDICATION FOR WEIGHT LOSS: None at this time.     VITAMIN/MINERAL SUPPLEMENTS: Nephrovite, Vitamin D, Tums     Is on peritoneal dialysis. Follows the normal dialysis diet recommendations. Potassium level actually runs low often. On a 2 liter fluid restrictions.     NUTRITION HISTORY:  Food allergies: NKFA   Food intolerances: Lactose intolerant - tries to limit, soy products   Barriers to lifestyle change: hard to plan food around busy life and activities    Works from home   Feels too tired to cook at times but this is getting better. Really trying to listen to her body when she is full to stop eating.     Starts her peritoneal dialysis around 5 or 6 PM - 9 hrs " in total.     Has been tracking her nutrition in an phill [employer].     Recent Diet Recall:  Breakfast: skips, not hungry, has never been a breakfast person.   Lunch: 10-11 AM, quick easy meals, leftovers or sandwich   Snack - grapes, cheese   Dinner: popcorn   Beverages: water is main source of hydration, glass of orange juice in the morning. Does not like soda anymore d/t it tasting strange.     April 2024: Last month had a bladder infection and that hindered her her progress she was making while she was recovering.  Last month was also working on getting her potassium back up due to it being low. For physical activity she has been trying to get out and do some walking around the house. Started trying to eat around 9 AM. Today had a bowl of vegetables. Has been eating around 9, 2:30 or 3 PM. Might have a snack. Mainly drinks water, has been adding fruits to her water.     EXERCISE: Low energy, gets tired easily. Works from home and gets up every few hours to walk around the house.      ADDITIONAL INFORMATION:  Works as a , has 4 teenage kids.     NUTRITION DIAGNOSIS:  Obesity r/t long history of positive energy balance aeb BMI >30 kg/m2.    INTERVENTION:  Intervention Provided/Education Provided on post-op diet guidelines, vitamins/minerals essential post-operatively, GI anatomy of bariatric surgeries, ways to help prepare for post-op diet guidelines pre-operatively, portion/calorie-control, mindful eating and sources of protein.  Patient demonstrates understanding.     Personal barriers to making and continuing required life changes have been identified, and strategies to overcome those barriers have been recommended AND family and social supports have been assessed and strategies to strengthen those supports have been recommended.    Provided pt with list of goals, RD contact information and resources listed below via [employer].       Expected Engagement: good    GOALS:  Relating To Eating:  Eat slowly  (20-30 minutes per meal), chewing foods well (25 chews per bite/applesauce consistency)  Eat 3 meals per day or develop more of a consistent routine earlier on in the day.   Consume 60-90 g protein per day    Relating to beverages:  Reduce caffeine/carbonation/calorie containing beverages  Separate fluids from meals by 30 minutes before, during, and after eating  Drink 48-64 ounces of fluid per day    Relating to dietary supplements:  Continue with current supplement routine.     Relating to activity:  Increase activity as able    The Plate Method  [email or web address]    Protein Sources for Weight Loss  [email or web address]     Carbohydrates  [email or web address]     Mindful Eating  [email or web address]     Summary of Volumetrics Eating Plan  [email or web address]     Diet Guidelines after Weight Loss Surgery  [email or web address]     Seated Exercises for Arms and Legs (can be done before or after surgery)  [email or web address]    Follow Up: May 17.     Time spent with patient: 18 minutes.  Veronica Barger RD, LD

## 2024-04-19 ENCOUNTER — VIRTUAL VISIT (OUTPATIENT)
Dept: ENDOCRINOLOGY | Facility: CLINIC | Age: 49
End: 2024-04-19
Payer: COMMERCIAL

## 2024-04-19 VITALS — HEIGHT: 63 IN | BODY MASS INDEX: 51.91 KG/M2 | WEIGHT: 293 LBS

## 2024-04-19 DIAGNOSIS — N18.6 END STAGE RENAL FAILURE ON DIALYSIS (H): ICD-10-CM

## 2024-04-19 DIAGNOSIS — E66.9 OBESITY: ICD-10-CM

## 2024-04-19 DIAGNOSIS — Z71.3 NUTRITIONAL COUNSELING: Primary | ICD-10-CM

## 2024-04-19 DIAGNOSIS — Z99.2 END STAGE RENAL FAILURE ON DIALYSIS (H): ICD-10-CM

## 2024-04-19 PROCEDURE — 97803 MED NUTRITION INDIV SUBSEQ: CPT | Mod: 95

## 2024-04-19 PROCEDURE — 99207 PR NO CHARGE LOS: CPT | Mod: 95

## 2024-04-19 ASSESSMENT — PAIN SCALES - GENERAL: PAINLEVEL: NO PAIN (0)

## 2024-04-19 NOTE — NURSING NOTE
Is the patient currently in the state of [place]? YES    Visit mode:VIDEO    If the visit is dropped, the patient can be reconnected by: VIDEO VISIT: Text to cell phone:   Telephone Information:   Mobile [phone]       Will anyone else be joining the visit? NO  (If patient encounters technical issues they should call [phone]    How would you like to obtain your AVS? MyChart    Are changes needed to the allergy or medication list? N/A    Are refills needed on medications prescribed by this physician? NO     Reason for visit: RECHECK    Wt/ht other than 24 hrs:    Pain more than one location:  no  Sofia THOMAS

## 2024-04-19 NOTE — PATIENT INSTRUCTIONS
Henri Christiansen,     Follow-up with RD on May 17.     Thank you,    Veronica Barger, RD, LD  If you would like to schedule or reschedule an appointment with the RD, please call [phone]    Nutrition Goals  Relating To Eating:  Eat slowly (20-30 minutes per meal), chewing foods well (25 chews per bite/applesauce consistency)  Eat 3 meals per day or develop more of a consistent routine earlier on in the day.   Consume 60-90 g protein per day    Relating to beverages:  Reduce caffeine/carbonation/calorie containing beverages  Separate fluids from meals by 30 minutes before, during, and after eating  Drink 48-64 ounces of fluid per day    Relating to dietary supplements:  Continue with current supplement routine.     Relating to activity:  Increase activity as able    The Plate Method  [email or web address]    Protein Sources for Weight Loss  [email or web address]     Carbohydrates  [email or web address]     Mindful Eating  [email or web address]     Summary of Volumetrics Eating Plan  [email or web address]     Diet Guidelines after Weight Loss Surgery  [email or web address]     Seated Exercises for Arms and Legs (can be done before or after surgery)  [email or web address]    COMPREHENSIVE WEIGHT MANAGEMENT PROGRAM  VIRTUAL SUPPORT GROUPS    At [facility], our Comprehensive Weight Management program offers on-line support groups for patients who are working on weight loss and considering, preparing for, or have had weight loss surgery.     There is no cost for this opportunity.  You are invited to attend the?Virtual Support Groups?provided by any of the following locations:    [facility] via You [employer] Teams with Joan Leyva RN  2.   [place] via [employer] Teams with Dio Vidales, PhD, LP  3.   [place] via [employer] Teams with Neela Lopez RN  4.   [facility] via a Zoom Meeting with YUE Carrera-    The following Support Group  "information can also be found on our website:  [email or web address]      [facility] Weight Loss Surgery Support Group  The support group is a patient-lead forum that meets monthly to share experiences, encouragement and education. It is open to those who have had weight loss surgery, are scheduled for surgery, or are considering surgery.   WHEN: This group meets on the 3rd Wednesday of each month from 5:00PM - 6:00PM virtually using Microsoft Teams.   FACILITATOR: Led by Joan Parmar, DAVID, LD, RN, the program's Clinical Coordinator.   TO REGISTER: Please contact the clinic via [employer] or call the nurse line directly at [phone] to inform our staff that you would like an invite sent to you and to let us know the email you would like the invite sent to. Prior to the meeting, a link with directions on how to join the meeting will be sent to you.    2024 Meetings   January 17  February 21  March 20  April 17  May 15  Li 19      [facility] Bariatric Care Support Group?  This is open to all pre- and post- operative bariatric surgery patients as well as their support system.   WHEN: This group meets the 3rd Tuesday of each month from 6:30 PM - 8:00 PM virtually using Microsoft Teams.   FACILITATOR: Led by Dio Vidales, Ph.D who is a Licensed Psychologist with the [facility] Comprehensive Weight Management Program.   TO REGISTER: Please send an email to Dio Vidales, Ph.D., LP at?[email or web address]?if you would like an invitation to the group. Prior to the meeting, a link with directions on how to join the meeting will be sent to you.    2024 Meetings January 16: \"Medication Management and Bariatric Surgery\", Elvira Everett, PharmD, Pharmacy Resident at [facility]  February 20: \"A Bariatric Surgery Patient's Perspective\", Trupti" "CHANEL Hernandez, SARISW, Behavioral Health Clinician at [facility]  March 19  April 16  May 21  Li 18: \"Nutritional Labeling\", Dietitian speaker to be announced.  November 19: \"Holiday Eating\", Dietitian speaker to be announced.    [facility] Post-Operative Bariatric Surgery Support Group  This is a support group for [facility] bariatric patients (and those external to [facility]) who have had bariatric surgery and are at least 3 months post-surgery.  WHEN: This support group meets the 4th Wednesday of the month from 11:00 AM - 12:00 PM virtually using Microsoft Teams.   FACILITATOR: Led by Certified Bariatric Nurse, Neela Lopez RN.   TO REGISTER: Please send an email to Neela at [email or web address] if you would like an invitation to the group.  Prior to the meeting, a link with directions on how to join the meeting will be sent to you.    2024 Meetings  January 24  February 28  March 27  April 24  May 22  Li 26    [facility] Healthy Lifestyle Group?  This is a 60 minute virtual coaching group for those who want to lead a healthier lifestyle. Come together to set goals and overcome barriers in a supportive group environment. We will address the four pillars of health: nutrition, exercise, sleep and emotional well-being.  This group is highly recommended for those who are participating in the 24 week Healthy Lifestyle Plan and our Health Coaching sessions.  WHEN: This group meets the 1st Friday of the month, 12:30 PM - 1:30 PM online, via a zoom meeting.    FACILITATOR: Led by National Board Certified Health and , Neela Xavier, [facility].   TO REGISTER: Please call the Call Center at [phone] to register. You will get an appointment to attend in [employer]. Fifteen minutes prior to the meeting, complete the e-check in and you will get the link to join the " "meeting.    There is no charge to attend this group and space is limited.     2024 Meetings  January 5: \"New Years Vision: Manifest your Best 2024!\" (guided imagery,  journaling and discussion)  February 2: \"Let's Talk\"  March 1: \"10 Percent Happier\" by Bhavik Prakash (Book Bites - a guided discussion on the nuggets of wisdom from favorite wellness books, no need to read the book but highly encouraged)  April 5: \"Let's Talk\"  May 3: \"Essentialism: The Disciplined Pursuit of Less\" by Kody Mcdermott (Book Bites discussion)  June 7: \"Let's Talk\"  July 5: NO MEETING, off for the 4th of July Holiday  August 2: \"The Blue Zones, Secrets for Living a Longer Life\" by Bhavik Schroeder (Book Bites discussion)        "

## 2024-04-19 NOTE — LETTER
"4/19/2024       RE: Елена Ko  [address] [place] [id number]     Dear Colleague,    Thank you for referring your patient, Елена Ko, to the [facility] [place] at [facility]. Please see a copy of my visit note below.    Video-Visit Details    Type of service:  Video Visit    Video Start Time: 10:28 AM  Video End Time: 10:45 AM     Originating Location (pt. Location): Home    Distant Location (provider location): Offsite (providers home) [facility] [place]     Platform used for Video Visit: Am[employer]    Return Bariatric Nutrition Consultation Note    Reason For Visit: Nutrition Assessment    Елена Ko is a 49 year old presenting today for return bariatric nutrition consult.   Patient is interested in possible weight loss surgery. Patient is accompanied by self.  This is patient's 4th nutrition visit prior to surgery. Saw transplant dietitian on 1/22/24 and completed the WLS nutrition class on 2/16/24.     Pt referred by JAVED Singleton  on January 22, 2024.     CO-MORBIDITIES OF OBESITY INCLUDE:  ESRD on dialysis since August 30, 2023, GERD, HTN    SUPPORT:       No data to display              ANTHROPOMETRICS:    Initial consult weight: 308 lb on 1/22/24   Estimated body mass index is 51.94 kg/m  as calculated from the following:    Height as of this encounter: 1.6 m (5' 3\").    Weight as of this encounter: 133 kg (293 lb 3.4 oz).  Current Weight: 293 lb per pt report    Needs BMI <40, ideally less than 35 for kidney transplant     MEDICATION FOR WEIGHT LOSS: None at this time.     VITAMIN/MINERAL SUPPLEMENTS: Nephrovite, Vitamin D, Tums     Is on peritoneal dialysis. Follows the normal dialysis diet recommendations. Potassium level actually runs low often. On a 2 liter fluid restrictions.     NUTRITION HISTORY:  Food allergies: NKFA   Food intolerances: Lactose " intolerant - tries to limit, soy products   Barriers to lifestyle change: hard to plan food around busy life and activities    Works from home   Feels too tired to cook at times but this is getting better. Really trying to listen to her body when she is full to stop eating.     Starts her peritoneal dialysis around 5 or 6 PM - 9 hrs in total.     Has been tracking her nutrition in an phill [employer].     Recent Diet Recall:  Breakfast: skips, not hungry, has never been a breakfast person.   Lunch: 10-11 AM, quick easy meals, leftovers or sandwich   Snack - grapes, cheese   Dinner: popcorn   Beverages: water is main source of hydration, glass of orange juice in the morning. Does not like soda anymore d/t it tasting strange.     April 2024: Last month had a bladder infection and that hindered her her progress she was making while she was recovering.  Last month was also working on getting her potassium back up due to it being low. For physical activity she has been trying to get out and do some walking around the house. Started trying to eat around 9 AM. Today had a bowl of vegetables. Has been eating around 9, 2:30 or 3 PM. Might have a snack. Mainly drinks water, has been adding fruits to her water.     EXERCISE: Low energy, gets tired easily. Works from home and gets up every few hours to walk around the house.      ADDITIONAL INFORMATION:  Works as a , has 4 teenage kids.     NUTRITION DIAGNOSIS:  Obesity r/t long history of positive energy balance aeb BMI >30 kg/m2.    INTERVENTION:  Intervention Provided/Education Provided on post-op diet guidelines, vitamins/minerals essential post-operatively, GI anatomy of bariatric surgeries, ways to help prepare for post-op diet guidelines pre-operatively, portion/calorie-control, mindful eating and sources of protein.  Patient demonstrates understanding.     Personal barriers to making and continuing required life changes have been identified, and strategies to  overcome those barriers have been recommended AND family and social supports have been assessed and strategies to strengthen those supports have been recommended.    Provided pt with list of goals, RD contact information and resources listed below via [employer].       Expected Engagement: good    GOALS:  Relating To Eating:  Eat slowly (20-30 minutes per meal), chewing foods well (25 chews per bite/applesauce consistency)  Eat 3 meals per day or develop more of a consistent routine earlier on in the day.   Consume 60-90 g protein per day    Relating to beverages:  Reduce caffeine/carbonation/calorie containing beverages  Separate fluids from meals by 30 minutes before, during, and after eating  Drink 48-64 ounces of fluid per day    Relating to dietary supplements:  Continue with current supplement routine.     Relating to activity:  Increase activity as able    The Plate Method  [email or web address]    Protein Sources for Weight Loss  [email or web address]     Carbohydrates  [email or web address]     Mindful Eating  [email or web address]     Summary of Volumetrics Eating Plan  [email or web address]     Diet Guidelines after Weight Loss Surgery  [email or web address]     Seated Exercises for Arms and Legs (can be done before or after surgery)  [email or web address]    Follow Up: May 17.     Time spent with patient: 18 minutes.  Veronica Barger RD, LD

## 2024-07-18 ENCOUNTER — TELEPHONE (OUTPATIENT)
Dept: ENDOCRINOLOGY | Facility: CLINIC | Age: 49
End: 2024-07-18

## 2024-07-18 ENCOUNTER — VIRTUAL VISIT (OUTPATIENT)
Dept: ENDOCRINOLOGY | Facility: CLINIC | Age: 49
End: 2024-07-18
Payer: COMMERCIAL

## 2024-07-18 VITALS — WEIGHT: 284.39 LBS | BODY MASS INDEX: 48.55 KG/M2 | HEIGHT: 64 IN

## 2024-07-18 DIAGNOSIS — E66.813 CLASS 3 SEVERE OBESITY WITH SERIOUS COMORBIDITY AND BODY MASS INDEX (BMI) OF 45.0 TO 49.9 IN ADULT, UNSPECIFIED OBESITY TYPE (H): Primary | ICD-10-CM

## 2024-07-18 DIAGNOSIS — E66.01 CLASS 3 SEVERE OBESITY WITH SERIOUS COMORBIDITY AND BODY MASS INDEX (BMI) OF 45.0 TO 49.9 IN ADULT, UNSPECIFIED OBESITY TYPE (H): Primary | ICD-10-CM

## 2024-07-18 PROCEDURE — 99213 OFFICE O/P EST LOW 20 MIN: CPT | Mod: 95 | Performed by: PHYSICIAN ASSISTANT

## 2024-07-18 NOTE — PROGRESS NOTES
Return Medical Weight Management Note     Елена Ko  MRN:  [id number]  :  1975  ANA MARIA:  2024    Dear SHANNON RUIZ MD,    I had the pleasure of seeing your patient Елена Ko. She is a 49 year old female who I am continuing to see for treatment of obesity related to:    Assessment & Plan   Problem List Items Addressed This Visit          Endocrine Diagnoses    Class 3 severe obesity with serious comorbidity and body mass index (BMI) of 45.0 to 49.9 in adult, unspecified obesity type (H) - Primary      Weight mgmt consult  ESRD on dialysis since Aug 2023  Needs BMI <40, ideally less than 35 for kidney transplant.     Lost from 338 to 284 lbs lbs. Since August.   Was taking Ozempic and no longer covered by insurance  Endo gave samples of Ozempic and she is taking 0.5mg, planning to get more samples    BMI 49 now    Working with DAVID Martin last visit 24    Follow up:    MTM 2 months  RD 4 months  Keli 6 months      INTERVAL HISTORY:       ESRD on dialysis since 2023. ESRD came on suddenly.  ? autoimmune cause.  90% scar tissue on biopsy.  Rheumatology: working on autoimmune work up.      Nephrologist: Dr Abundio VACA  Dialysis at [facility]. PD since Oct.      Diabetes: No diabetes       Anti-obesity medication history    Current:   Ozempic 1mg      CURRENT WEIGHT:   284 lbs 6.29 oz    Initial Weight (lbs): 308 lbs  Last Visits Weight: 139.7 kg (308 lb)  Cumulative weight loss (lbs): 23.61  Weight Loss Percentage: 7.67%        2024     8:29 AM   Changes and Difficulties   I have made the following changes to my diet since my last visit: Tracking food intake through an phill   With regards to my diet, I am still struggling with: eating througout the day   I have made the following changes to my activity/exercise since my last visit: walking around the house more often throughout the day   With regards to my activity/exercise, I am still struggling with:  "having any energy         MEDICATIONS:   Current Outpatient Medications   Medication Sig Dispense Refill    aspirin 81 MG EC tablet Take 81 mg by mouth daily      Calcium Carb-Cholecalciferol (OYSCO 500 + D) 500-5 MG-MCG TABS Take 1 tablet by mouth daily      carvedilol (COREG) 6.25 MG tablet Take 6.25 mg by mouth 2 times daily (with meals)      Fexofenadine HCl (ALLEGRA ALLERGY PO)       levothyroxine (SYNTHROID/LEVOTHROID) 125 MCG tablet levothyroxine 125 mcg      multivitamin RENAL (NEPHROVITE) 0.8 MG tablet Take 1 tablet by mouth      NIFEdipine ER (ADALAT CC) 60 MG 24 hr tablet Take 60 mg by mouth daily      pantoprazole (PROTONIX) 40 MG EC tablet Take 40 mg by mouth daily      predniSONE (DELTASONE) 20 MG tablet Take 20 mg by mouth once as needed      Semaglutide, 1 MG/DOSE, (OZEMPIC) 4 MG/3ML pen Inject 1 mg Subcutaneous every 7 days      torsemide (DEMADEX) 20 MG tablet Take 20 mg by mouth daily      vitamin D2 (ERGOCALCIFEROL) 91360 units (1250 mcg) capsule Take 50,000 Units by mouth once a week             7/18/2024     8:29 AM   Weight Loss Medication History Reviewed With Patient   Which weight loss medications are you currently taking on a regular basis? Ozempic       No results found for any previous visit.       PHYSICAL EXAM:  Objective    Ht 1.613 m (5' 3.5\")   Wt 129 kg (284 lb 6.3 oz)   BMI 49.59 kg/m             Vitals:  No vitals were obtained today due to virtual visit.    GENERAL: alert and no distress  EYES: Eyes grossly normal to inspection.  No discharge or erythema, or obvious scleral/conjunctival abnormalities.  RESP: No audible wheeze, cough, or visible cyanosis.    SKIN: Visible skin clear. No significant rash, abnormal pigmentation or lesions.  NEURO: Cranial nerves grossly intact.  Mentation and speech appropriate for age.  PSYCH: Appropriate affect, tone, and pace of words        Sincerely,    Keli Gibson PA-C      10 minutes spent by me on the date of the encounter " doing chart review, history and exam, documentation and further activities per the note

## 2024-07-18 NOTE — LETTER
2024       RE: Елена Ko  [address] [place] [id number]     Dear Colleague,    Thank you for referring your patient, Елена Ko, to the [facility] WEIGHT MANAGEMENT CLINIC [place] [facility]. Please see a copy of my visit note below.      Return Medical Weight Management Note     Елена Ko  MRN:  [id number]  :  1975  ANA MARIA:  2024    Dear SHANNON RUIZ MD,    I had the pleasure of seeing your patient Елена Ko. She is a 49 year old female who I am continuing to see for treatment of obesity related to:    Assessment & Plan  Problem List Items Addressed This Visit          Endocrine Diagnoses    Class 3 severe obesity with serious comorbidity and body mass index (BMI) of 45.0 to 49.9 in adult, unspecified obesity type (H) - Primary      Weight mgmt consult  ESRD on dialysis since Aug 2023  Needs BMI <40, ideally less than 35 for kidney transplant.     Lost from 338 to 284 lbs lbs. Since August.   Was taking Ozempic and no longer covered by insurance  Endo gave samples of Ozempic and she is taking 0.5mg, planning to get more samples    BMI 49 now    Working with DAVID Martin last visit 24    Follow up:    MTM 2 months  RD 4 months  Keli 6 months      INTERVAL HISTORY:       ESRD on dialysis since 2023. ESRD came on suddenly.  ? autoimmune cause.  90% scar tissue on biopsy.  Rheumatology: working on autoimmune work up.      Nephrologist: Dr Abundio VACA  Dialysis at [facility]. PD since Oct.      Diabetes: No diabetes       Anti-obesity medication history    Current:   Ozempic 1mg      CURRENT WEIGHT:   284 lbs 6.29 oz    Initial Weight (lbs): 308 lbs  Last Visits Weight: 139.7 kg (308 lb)  Cumulative weight loss (lbs): 23.61  Weight Loss Percentage: 7.67%        2024     8:29 AM   Changes and Difficulties   I have made the following changes to my diet since my  "last visit: Tracking food intake through an phill   With regards to my diet, I am still struggling with: eating througout the day   I have made the following changes to my activity/exercise since my last visit: walking around the house more often throughout the day   With regards to my activity/exercise, I am still struggling with: having any energy         MEDICATIONS:   Current Outpatient Medications   Medication Sig Dispense Refill    aspirin 81 MG EC tablet Take 81 mg by mouth daily      Calcium Carb-Cholecalciferol (OYSCO 500 + D) 500-5 MG-MCG TABS Take 1 tablet by mouth daily      carvedilol (COREG) 6.25 MG tablet Take 6.25 mg by mouth 2 times daily (with meals)      Fexofenadine HCl (ALLEGRA ALLERGY PO)       levothyroxine (SYNTHROID/LEVOTHROID) 125 MCG tablet levothyroxine 125 mcg      multivitamin RENAL (NEPHROVITE) 0.8 MG tablet Take 1 tablet by mouth      NIFEdipine ER (ADALAT CC) 60 MG 24 hr tablet Take 60 mg by mouth daily      pantoprazole (PROTONIX) 40 MG EC tablet Take 40 mg by mouth daily      predniSONE (DELTASONE) 20 MG tablet Take 20 mg by mouth once as needed      Semaglutide, 1 MG/DOSE, (OZEMPIC) 4 MG/3ML pen Inject 1 mg Subcutaneous every 7 days      torsemide (DEMADEX) 20 MG tablet Take 20 mg by mouth daily      vitamin D2 (ERGOCALCIFEROL) 81045 units (1250 mcg) capsule Take 50,000 Units by mouth once a week             7/18/2024     8:29 AM   Weight Loss Medication History Reviewed With Patient   Which weight loss medications are you currently taking on a regular basis? Ozempic       No results found for any previous visit.       PHYSICAL EXAM:  Objective   Ht 1.613 m (5' 3.5\")   Wt 129 kg (284 lb 6.3 oz)   BMI 49.59 kg/m             Vitals:  No vitals were obtained today due to virtual visit.    GENERAL: alert and no distress  EYES: Eyes grossly normal to inspection.  No discharge or erythema, or obvious scleral/conjunctival abnormalities.  RESP: No audible wheeze, cough, or visible " cyanosis.    SKIN: Visible skin clear. No significant rash, abnormal pigmentation or lesions.  NEURO: Cranial nerves grossly intact.  Mentation and speech appropriate for age.  PSYCH: Appropriate affect, tone, and pace of words        Sincerely,    Keli Gibson PA-C      10 minutes spent by me on the date of the encounter doing chart review, history and exam, documentation and further activities per the note

## 2024-07-18 NOTE — TELEPHONE ENCOUNTER
General Call    Contacts       Contact Date/Time Type Contact Phone/Fax    07/18/2024 08:31 AM CDT Phone (Incoming) StefaniЕлена (Self) [phone] (H)          Reason for Call:  appt today    What are your questions or concerns:  pt states she was asked to join appt earlier today but can not log in       Could we send this information to you in [employer] or would you prefer to receive a phone call?:   Patient would prefer a phone call   Okay to leave a detailed message?: Yes at Cell number on file:    Telephone Information:   Mobile [phone]

## 2024-07-22 ENCOUNTER — TELEPHONE (OUTPATIENT)
Dept: ENDOCRINOLOGY | Facility: CLINIC | Age: 49
End: 2024-07-22
Payer: COMMERCIAL

## 2024-07-22 NOTE — TELEPHONE ENCOUNTER
Left Voicemail (1st Attempt) for the patient to call back and schedule the following:    Appointment type: Return weight Mgmt  Provider: Keli Gibson  Return date: January, 2025  Specialty phone number: [phone]

## 2024-07-24 PROBLEM — E66.813 CLASS 3 SEVERE OBESITY WITH SERIOUS COMORBIDITY AND BODY MASS INDEX (BMI) OF 45.0 TO 49.9 IN ADULT, UNSPECIFIED OBESITY TYPE (H): Status: ACTIVE | Noted: 2024-07-24

## 2024-07-24 PROBLEM — E66.01 CLASS 3 SEVERE OBESITY WITH SERIOUS COMORBIDITY AND BODY MASS INDEX (BMI) OF 45.0 TO 49.9 IN ADULT, UNSPECIFIED OBESITY TYPE (H): Status: ACTIVE | Noted: 2024-07-24

## 2024-12-05 ENCOUNTER — TELEPHONE (OUTPATIENT)
Dept: ENDOCRINOLOGY | Facility: CLINIC | Age: 49
End: 2024-12-05
Payer: COMMERCIAL

## 2024-12-05 NOTE — TELEPHONE ENCOUNTER
Sent [employer] (1st Attempt) for the patient to call back and schedule the following:    Appointment type: reschedule return [facility]  Provider: Keli Gibson PA-C  Return date: Next available & waitlist  Specialty phone number: [phone]  Additional appointment(s) needed:   Additonal Notes: Phone line had no sound. Couldn't leave a voice mail.

## 2025-03-11 ENCOUNTER — TELEPHONE (OUTPATIENT)
Dept: TRANSPLANT | Facility: CLINIC | Age: 50
End: 2025-03-11
Payer: COMMERCIAL

## 2025-03-11 NOTE — TELEPHONE ENCOUNTER
Calling dialysis center to check in on Елена's weight. She needs to be at a BMI of 40 for full evaluation.  Per dialysis records, currently her weight is 120kg which puts her at a BMI of 46.8. She is due to see Keli and DAVID here as part of her weight management program. Will call patient to see if she wants to get back into the clinic to see them.

## 2025-03-11 NOTE — TELEPHONE ENCOUNTER
Endocrine - prescription from [facility]. She is unsure if insurance will cover this so she is waiting to hear from the pharmacy on that.  She has an apt with weight management 3/20.  She has about 40 additional pounds to lose and realistically feels this could be done by summer. She will continue to call me to check in as she makes progress.

## 2025-03-20 ENCOUNTER — VIRTUAL VISIT (OUTPATIENT)
Dept: ENDOCRINOLOGY | Facility: CLINIC | Age: 50
End: 2025-03-20
Payer: COMMERCIAL

## 2025-03-20 VITALS — WEIGHT: 264.55 LBS | HEIGHT: 63 IN | BODY MASS INDEX: 46.88 KG/M2

## 2025-03-20 DIAGNOSIS — N18.6 END STAGE RENAL FAILURE ON DIALYSIS (H): Primary | ICD-10-CM

## 2025-03-20 DIAGNOSIS — Z99.2 END STAGE RENAL FAILURE ON DIALYSIS (H): Primary | ICD-10-CM

## 2025-03-20 ASSESSMENT — PAIN SCALES - GENERAL: PAINLEVEL_OUTOF10: MILD PAIN (3)

## 2025-03-20 NOTE — NURSING NOTE
Current patient location: work     Is the patient currently in the state of [place]? YES    Visit mode: TELEPHONE    If the visit is dropped, the patient can be reconnected by:TELEPHONE VISIT: Phone number: [phone]    Will anyone else be joining the visit? NO  (If patient encounters technical issues they should call [phone] :[id number])    Are changes needed to the allergy or medication list? Pt stated no changes to allergies and Pt stated no med changes    Are refills needed on medications prescribed by this physician? NO    Rooming Documentation:  Questionnaire(s) completed      Reason for visit: RECHECK    Wt other than 24 hrs:    Pain more than one location:  3 back, joints  Sofia HUMPHREYF

## 2025-03-20 NOTE — LETTER
3/20/2025       RE: Елена Ko  [address] [place] [id number]     Dear Colleague,    Thank you for referring your patient, Елена Ko, to the [facility] WEIGHT MANAGEMENT CLINIC [place] at [facility]. Please see a copy of my visit note below.      Return Medical Weight Management Note     Елена Ko  MRN:  [id number]  :  1975  ANA MARIA:  3/20/2025    Dear SHANNON RUIZ MD,    I had the pleasure of seeing your patient Елена Ko. She is a 50 year old female who I am continuing to see for treatment of obesity related to:         No data to display              Weight was up to 330 around 2023, then down to 264 with not being as hungry and working on portion control.     Ozempic caused nausea and constipation issues. Her endocrinologist just prescribed mounjaro and its not known if its covered yet.     Getting peritoneal dialysis.     Had a really high blood pressure when first seen and that is why she was put on coreg.     CURRENT WEIGHT:   264 lbs 8.83 oz  Wt Readings from Last 4 Encounters:   25 120 kg (264 lb 8.8 oz)   24 129 kg (284 lb 6.3 oz)   24 133 kg (293 lb 3.4 oz)   03/15/24 134 kg (295 lb 6.7 oz)     Initial Weight (lbs): 308 lbs     Cumulative weight loss (lbs): 43.45  Weight Loss Percentage: 14.11%    MEDICATIONS:   Current Outpatient Medications   Medication Sig Dispense Refill     pantoprazole (PROTONIX) 40 MG EC tablet Take 40 mg by mouth daily (Patient taking differently: Take 40 mg by mouth daily. Pt taking 20 mg.)       aspirin 81 MG EC tablet Take 81 mg by mouth daily       Calcium Carb-Cholecalciferol (OYSCO 500 + D) 500-5 MG-MCG TABS Take 1 tablet by mouth daily       carvedilol (COREG) 6.25 MG tablet Take 6.25 mg by mouth 2 times daily (with meals)       Fexofenadine HCl (ALLEGRA ALLERGY PO)        levothyroxine (SYNTHROID/LEVOTHROID) 125 MCG tablet levothyroxine 125 mcg    "    multivitamin RENAL (NEPHROVITE) 0.8 MG tablet Take 1 tablet by mouth       NIFEdipine ER (ADALAT CC) 60 MG 24 hr tablet Take 60 mg by mouth daily       predniSONE (DELTASONE) 20 MG tablet Take 20 mg by mouth once as needed       torsemide (DEMADEX) 20 MG tablet Take 20 mg by mouth daily       vitamin D2 (ERGOCALCIFEROL) 42839 units (1250 mcg) capsule Take 50,000 Units by mouth once a week             3/20/2025     3:00 PM   Weight Loss Medication History Reviewed With Patient   Which weight loss medications are you currently taking on a regular basis? None   If you are not taking a weight loss medication that was prescribed to you, please indicate why: The cost is too much for me    I did not like the side effects    My insurance does not cover the cost   Are you having any side effects from the weight loss medication that we have prescribed you? No     PHYSICAL EXAM:  Objective   Ht 1.6 m (5' 3\")   Wt 120 kg (264 lb 8.8 oz)   BMI 46.86 kg/m      GENERAL: alert and no distress  EYES: Eyes grossly normal to inspection.  No discharge or erythema, or obvious scleral/conjunctival abnormalities.  RESP: No audible wheeze, cough, or visible cyanosis.    SKIN: Visible skin clear. No significant rash, abnormal pigmentation or lesions.  NEURO: Cranial nerves grossly intact.  Mentation and speech appropriate for age.  PSYCH: Appropriate affect, tone, and pace of words    ASSESSMENT/PLAN:    Ms Ko has ESRD and is undergoing daily peritoneal dialysis. She still makes some urine (1200 ml/day). She has never had a kidney stone. She needs to achieve a BMI < 40 to get a renal transplant.      Patient Instructions   Nephrology: next time you see them, ask them about weaning down on carvedilol b/c it can cause weight gain    Our dieticians can help you lose weight given your hard schedule with peritoneal dialysis.    Weight: try to weigh daily     Topiramate: can increase the risk of kidney stones in people with a history of " kidney stones. You can read about it below before our next visit.     Topiramate (Topamax )  What is it used for?  Topiramate helps patients feel full more quickly and feel less hungry.  It may also help patients binge eat less often.  Topiramate may help you stick to a healthy diet, though used alone, it will not cause weight loss.  Although topiramate is not currently approved by the FDA for weight management, it is used commonly in weight management clinics for this purpose.  Just how topiramate helps with weight loss has not been exactly determined. However it seems to work on areas of the brain to quiet down signals related to eating.      Topiramate may help you:    >feel less interest in eating in between meals   >think less about food and eating   >find it easier to push the plate away   >find giving up pop easier    >have an easier time eating less    For some of our patients, the pills work right away. They feel and think quite differently about food. Other patients don't feel much of a change but find, in fact, they have lost weight! Like all weight loss medications, topiramate works best when you help it work.  This means:   >have less tempting high calorie (fattening) food around the house    >have lower calorie food (fruits, vegetables, low fat meats and dairy) for   snacks    >eat out only one time or less each week.   >eat your meals at a table with the TV or computer off.    How does it work?  Topiramate is a medication that was originally developed to treat seizures in children and migraine headaches in adults.  It affects chemical messengers in the brain, but the exact way it works to decrease weight is unknown.      How should I take this medication?  Start one tab, 25 mg, for a week.  Increase  to 50 mg (2 tabs) for the next week.  Stay at 2 tabs until you are seen again. Call [phone] if you have any questions or concerns.   Is topiramate safe?  Most people tolerate topiramate with no  problems.  Please tell your doctor if you have a history of kidney stones, if you are taking phenytoin or birth control pills, or if you are pregnant.  Topiramate is harmful in pregnancy.  Topiramate can decrease your ability to tolerate hot weather.  You should be sure to drink plenty of water to prevent dehydration and kidney stones.  What are the side effects?  Call your doctor right away if you notice any of these side effects:  Change in mood, especially thoughts of suicide  Rash   Pain in your flanks (side and back) or groin  If you notice these less serious side effects, talk with your doctor:  Numbness or tingling in hands and feet  Nausea  Mental fogginess, trouble concentrating, memory problems  Diarrhea    One of the dangers of topiramate is the possibility of birth defects--if you get pregnant when you are taking topiramate, there is the risk that your baby will be born with a cleft lip or palate.  If you are on topiramate and of child bearing age, you need to be on a reliable form of birth control or refrain from sexual intercourse.     Important note:  Topiramate may decrease the effectiveness of birth control pills.                                               Sincerely,    Cori Garcia MD      I spent a total of 25 minutes on the day of the visit.   Time spent by me today doing chart review, history and exam, documentation and further activities per the note      Virtual Visit Details    Type of service:  Telephone Visit   Phone call duration:  minutes   Originating Location (pt. Location): Home  {PROVIDER LOCATION On-site should be selected for visits conducted from your clinic location or adjoining [facility] hospital, academic office, or other nearby [facility] building. Off-site should be selected for all other provider locations, including home:[id number]}  Distant Location (provider location):  On-site  Telephone visit completed due to the patient did not have access to video, while the distant  provider did.      Again, thank you for allowing me to participate in the care of your patient.      Sincerely,    Cori Garcia MD

## 2025-03-20 NOTE — PROGRESS NOTES
Return Medical Weight Management Note     Елена Ko  MRN:  [id number]  :  1975  ANA MARIA:  3/20/2025    Dear SHANNON RUIZ MD,    I had the pleasure of seeing your patient Елена Ko. She is a 50 year old female who I am continuing to see for treatment of obesity related to:         No data to display              Weight was up to 330 around 2023, then down to 264 with not being as hungry and working on portion control.     Ozempic caused nausea and constipation issues. Her endocrinologist just prescribed mounjaro and its not known if its covered yet.     Getting peritoneal dialysis.     Had a really high blood pressure when first seen and that is why she was put on coreg.     CURRENT WEIGHT:   264 lbs 8.83 oz  Wt Readings from Last 4 Encounters:   25 120 kg (264 lb 8.8 oz)   24 129 kg (284 lb 6.3 oz)   24 133 kg (293 lb 3.4 oz)   03/15/24 134 kg (295 lb 6.7 oz)     Initial Weight (lbs): 308 lbs     Cumulative weight loss (lbs): 43.45  Weight Loss Percentage: 14.11%    MEDICATIONS:   Current Outpatient Medications   Medication Sig Dispense Refill    pantoprazole (PROTONIX) 40 MG EC tablet Take 40 mg by mouth daily (Patient taking differently: Take 40 mg by mouth daily. Pt taking 20 mg.)      aspirin 81 MG EC tablet Take 81 mg by mouth daily      Calcium Carb-Cholecalciferol (OYSCO 500 + D) 500-5 MG-MCG TABS Take 1 tablet by mouth daily      carvedilol (COREG) 6.25 MG tablet Take 6.25 mg by mouth 2 times daily (with meals)      Fexofenadine HCl (ALLEGRA ALLERGY PO)       levothyroxine (SYNTHROID/LEVOTHROID) 125 MCG tablet levothyroxine 125 mcg      multivitamin RENAL (NEPHROVITE) 0.8 MG tablet Take 1 tablet by mouth      NIFEdipine ER (ADALAT CC) 60 MG 24 hr tablet Take 60 mg by mouth daily      predniSONE (DELTASONE) 20 MG tablet Take 20 mg by mouth once as needed      torsemide (DEMADEX) 20 MG tablet Take 20 mg by mouth daily      vitamin D2 (ERGOCALCIFEROL) 61540 units  "(1250 mcg) capsule Take 50,000 Units by mouth once a week             3/20/2025     3:00 PM   Weight Loss Medication History Reviewed With Patient   Which weight loss medications are you currently taking on a regular basis? None   If you are not taking a weight loss medication that was prescribed to you, please indicate why: The cost is too much for me    I did not like the side effects    My insurance does not cover the cost   Are you having any side effects from the weight loss medication that we have prescribed you? No     PHYSICAL EXAM:  Objective    Ht 1.6 m (5' 3\")   Wt 120 kg (264 lb 8.8 oz)   BMI 46.86 kg/m      GENERAL: alert and no distress  EYES: Eyes grossly normal to inspection.  No discharge or erythema, or obvious scleral/conjunctival abnormalities.  RESP: No audible wheeze, cough, or visible cyanosis.    SKIN: Visible skin clear. No significant rash, abnormal pigmentation or lesions.  NEURO: Cranial nerves grossly intact.  Mentation and speech appropriate for age.  PSYCH: Appropriate affect, tone, and pace of words    ASSESSMENT/PLAN:    Ms Ko has ESRD and is undergoing daily peritoneal dialysis. She still makes some urine (1200 ml/day). She has never had a kidney stone. She needs to achieve a BMI < 40 to get a renal transplant.      Patient Instructions   Nephrology: next time you see them, ask them about weaning down on carvedilol b/c it can cause weight gain    Our dieticians can help you lose weight given your hard schedule with peritoneal dialysis.    Weight: try to weigh daily     Topiramate: can increase the risk of kidney stones in people with a history of kidney stones. You can read about it below before our next visit.     Topiramate (Topamax )  What is it used for?  Topiramate helps patients feel full more quickly and feel less hungry.  It may also help patients binge eat less often.  Topiramate may help you stick to a healthy diet, though used alone, it will not cause weight loss.  " Although topiramate is not currently approved by the FDA for weight management, it is used commonly in weight management clinics for this purpose.  Just how topiramate helps with weight loss has not been exactly determined. However it seems to work on areas of the brain to quiet down signals related to eating.      Topiramate may help you:    >feel less interest in eating in between meals   >think less about food and eating   >find it easier to push the plate away   >find giving up pop easier    >have an easier time eating less    For some of our patients, the pills work right away. They feel and think quite differently about food. Other patients don't feel much of a change but find, in fact, they have lost weight! Like all weight loss medications, topiramate works best when you help it work.  This means:   >have less tempting high calorie (fattening) food around the house    >have lower calorie food (fruits, vegetables, low fat meats and dairy) for   snacks    >eat out only one time or less each week.   >eat your meals at a table with the TV or computer off.    How does it work?  Topiramate is a medication that was originally developed to treat seizures in children and migraine headaches in adults.  It affects chemical messengers in the brain, but the exact way it works to decrease weight is unknown.      How should I take this medication?  Start one tab, 25 mg, for a week.  Increase  to 50 mg (2 tabs) for the next week.  Stay at 2 tabs until you are seen again. Call [phone] if you have any questions or concerns.   Is topiramate safe?  Most people tolerate topiramate with no problems.  Please tell your doctor if you have a history of kidney stones, if you are taking phenytoin or birth control pills, or if you are pregnant.  Topiramate is harmful in pregnancy.  Topiramate can decrease your ability to tolerate hot weather.  You should be sure to drink plenty of water to prevent dehydration and kidney  stones.  What are the side effects?  Call your doctor right away if you notice any of these side effects:  Change in mood, especially thoughts of suicide  Rash   Pain in your flanks (side and back) or groin  If you notice these less serious side effects, talk with your doctor:  Numbness or tingling in hands and feet  Nausea  Mental fogginess, trouble concentrating, memory problems  Diarrhea    One of the dangers of topiramate is the possibility of birth defects--if you get pregnant when you are taking topiramate, there is the risk that your baby will be born with a cleft lip or palate.  If you are on topiramate and of child bearing age, you need to be on a reliable form of birth control or refrain from sexual intercourse.     Important note:  Topiramate may decrease the effectiveness of birth control pills.                                               Sincerely,    Cori Garcia MD      I spent a total of 25 minutes on the day of the visit.   Time spent by me today doing chart review, history and exam, documentation and further activities per the note      Virtual Visit Details    Type of service:  Telephone Visit   Phone call duration:  minutes   Originating Location (pt. Location): Home    Distant Location (provider location):  On-site  Telephone visit completed due to the patient did not have access to video, while the distant provider did.

## 2025-03-20 NOTE — PATIENT INSTRUCTIONS
Nephrology: next time you see them, ask them about weaning down on carvedilol b/c it can cause weight gain    Our dieticians can help you lose weight given your hard schedule with peritoneal dialysis.    Weight: try to weigh daily     Topiramate: can increase the risk of kidney stones in people with a h/o kidney stones. You can read about it below before our next visit.     Topiramate (Topamax )  What is it used for?  Topiramate helps patients feel full more quickly and feel less hungry.  It may also help patients binge eat less often.  Topiramate may help you stick to a healthy diet, though used alone, it will not cause weight loss.  Although topiramate is not currently approved by the FDA for weight management, it is used commonly in weight management clinics for this purpose.  Just how topiramate helps with weight loss has not been exactly determined. However it seems to work on areas of the brain to quiet down signals related to eating.      Topiramate may help you:    >feel less interest in eating in between meals   >think less about food and eating   >find it easier to push the plate away   >find giving up pop easier    >have an easier time eating less    For some of our patients, the pills work right away. They feel and think quite differently about food. Other patients don't feel much of a change but find, in fact, they have lost weight! Like all weight loss medications, topiramate works best when you help it work.  This means:   >have less tempting high calorie (fattening) food around the house    >have lower calorie food (fruits, vegetables, low fat meats and dairy) for   snacks    >eat out only one time or less each week.   >eat your meals at a table with the TV or computer off.    How does it work?  Topiramate is a medication that was originally developed to treat seizures in children and migraine headaches in adults.  It affects chemical messengers in the brain, but the exact way it works to decrease  weight is unknown.      How should I take this medication?  Start one tab, 25 mg, for a week.  Increase  to 50 mg (2 tabs) for the next week.  Stay at 2 tabs until you are seen again. Call [phone] if you have any questions or concerns.   Is topiramate safe?  Most people tolerate topiramate with no problems.  Please tell your doctor if you have a history of kidney stones, if you are taking phenytoin or birth control pills, or if you are pregnant.  Topiramate is harmful in pregnancy.  Topiramate can decrease your ability to tolerate hot weather.  You should be sure to drink plenty of water to prevent dehydration and kidney stones.  What are the side effects?  Call your doctor right away if you notice any of these side effects:  Change in mood, especially thoughts of suicide  Rash   Pain in your flanks (side and back) or groin  If you notice these less serious side effects, talk with your doctor:  Numbness or tingling in hands and feet  Nausea  Mental fogginess, trouble concentrating, memory problems  Diarrhea    One of the dangers of topiramate is the possibility of birth defects--if you get pregnant when you are taking topiramate, there is the risk that your baby will be born with a cleft lip or palate.  If you are on topiramate and of child bearing age, you need to be on a reliable form of birth control or refrain from sexual intercourse.     Important note:  Topiramate may decrease the effectiveness of birth control pills.

## 2025-04-07 ENCOUNTER — TELEPHONE (OUTPATIENT)
Dept: ENDOCRINOLOGY | Facility: CLINIC | Age: 50
End: 2025-04-07
Payer: COMMERCIAL

## 2025-04-07 NOTE — TELEPHONE ENCOUNTER
Left Voicemail (1st Attempt) and Sent Mychart (1st Attempt) for the patient to call back and schedule the following:    Appointment type: Return Med WT MGMT Nutrition  Appointment mode: Virtual Visit  Provider: Veronica Barger  Return date: Approx. 4/20/25  Specialty phone number: [phone] & Direct    Additional Notes:       Appointment type: Return Weight Management  Appointment mode: In Person or Virtual Visit  Provider: Dr. Cori Garcia  Return date: Approx. 6/20/25  Specialty phone number: [phone] & Direct    Additional Notes:

## 2025-04-27 ENCOUNTER — HEALTH MAINTENANCE LETTER (OUTPATIENT)
Age: 50
End: 2025-04-27

## 2025-04-30 ENCOUNTER — TELEPHONE (OUTPATIENT)
Dept: ENDOCRINOLOGY | Facility: CLINIC | Age: 50
End: 2025-04-30
Payer: COMMERCIAL

## 2025-04-30 NOTE — TELEPHONE ENCOUNTER
Patient confirmed scheduled appointment:     Date: 5/14/25  Time: 10:30 Am  Visit type: Return Med WT MGMT Nutrition  Visit mode: Virtual Visit  Provider:  Veronica Barger  [facility]    Additional Notes:         Date: 9/18/25 & Wait List  Time: 3:20 PM  Visit type: Return Weight Management  Visit mode: Virtual Visit  Provider:  Dr. Cori Garcia  [facility]    Additional Notes:

## 2025-05-12 NOTE — PROGRESS NOTES
"Video-Visit Details    Type of service:  Video Visit    Video Start Time: 10:28 AM   Video End Time: 10:55 AM     Originating Location (pt. Location): Home    Distant Location (provider location): Offsite (providers home) [facility] [place]     Platform used for Video Visit: [facility]    Return nutrition consultation note    Reason For Visit: Nutrition Assessment    Елена Ko is a 50 year old presenting today for return nutrition weight management consult.  Patient is accompanied by self.  This is patient's 5th nutrition visit. Saw transplant dietitian on 1/22/24 and completed the WLS nutrition class on 2/16/24.     Pt referred by JAVED Singelton  on January 22, 2024.     CO-MORBIDITIES OF OBESITY INCLUDE:  ESRD on dialysis since August 30, 2023, GERD, HTN    SUPPORT:       No data to display              ANTHROPOMETRICS:    Initial consult weight: 308 lb on 1/22/24   Estimated body mass index is 44.97 kg/m  as calculated from the following:    Height as of this encounter: 1.613 m (5' 3.5\").    Weight as of this encounter: 117 kg (257 lb 15 oz).  Current Weight: 257 lb per pt report, -51 lb (17% of bodyweight)     Needs BMI <40, ideally less than 35 for kidney transplant     MEDICATION FOR WEIGHT LOSS: Phentermine (Dr. Savage)     VITAMIN/MINERAL SUPPLEMENTS: Nephrovite, Vitamin D, Tums, Calcitriol 3x a week.     Is on peritoneal dialysis. Follows the normal dialysis diet recommendations. Potassium level actually runs low often. On a 2 liter fluid restrictions.     NUTRITION HISTORY:  Food allergies: NKFA   Food intolerances: Lactose intolerant - tries to limit, soy products   Barriers to lifestyle change: hard to plan food around busy life and activities    Works from home   Feels too tired to cook at times but this is getting better. Really trying to listen to her body when she is full to stop eating.     Starts her peritoneal dialysis around 5 or 6 PM - 9 hrs in total. "     Has been tracking her nutrition in an phill [employer].     Recent Diet Recall:  Breakfast: skips, not hungry, has never been a breakfast person.   Lunch: 10-11 AM, quick easy meals, leftovers or sandwich   Snack - grapes, cheese   Dinner: popcorn   Beverages: water is main source of hydration, glass of orange juice in the morning. Does not like soda anymore d/t it tasting strange.     April 2024: Last month had a bladder infection and that hindered her her progress she was making while she was recovering.  Last month was also working on getting her potassium back up due to it being low. For physical activity she has been trying to get out and do some walking around the house. Started trying to eat around 9 AM. Today had a bowl of vegetables. Has been eating around 9, 2:30 or 3 PM. Might have a snack. Mainly drinks water, has been adding fruits to her water.     May 2025: Last RD visit was in April 2024. Patient reports she has recently been dealing with some health struggles but is getting better.     Patient has been trying to be more aware and mindful of what she eats. Uses a food tracking phill called [employer]. A lot of the time doesn't eat enough calories. Has been tracking 4 days a week.     Doesn't have much feelings of hunger throughout the day. Starts working around 8:30 AM. Typically eats around 10:30/11 AM, not hungry before that usually. May have a banana earlier in the morning. Tries to be done eating by 3 PM so she isn't uncomfortable during her dialysis. [place] up to peritoneal around 5-6 PM and this runs for 9 hours.     Has been dealing with low hemoglobin, got an iron infusion last month.     EXERCISE: Low energy, gets tired easily. Tries to do a little activity with her dogs. Also has a weighted hoop.     ADDITIONAL INFORMATION:  Works as a , has 4 teenage kids.     NUTRITION DIAGNOSIS:  Obesity r/t long history of positive energy balance aeb BMI >30  "kg/m2.    INTERVENTION:  Materials/education provided on hypocaloric diet for weight loss.  Reviewed progress towards previous goals.  Praised pt on progress they have made.   Discussed potential barriers and strategies to overcome.   Encouraged increased physical activity  Patient demonstrates understanding.  Co-developed goals to work towards.   Provided pt with list of goals and resources below via [employer].     Expected Engagement: good    GOALS:  1) Wants to be at 230 lb by end of August   2) Aim for 60-90 grams of protein daily. Ideally averaging closer to 90 grams most often.   3) Small frequent meals every couple of hours or two meals + a protein shake or protein bar.   4) Aim for 30 minutes of physical activity daily.     Protein Sources for Weight Loss   [email or web address]     Quick/Easy Protein Sources:  Hard boiled eggs  Part-skim cheese sticks  Baby Bell cheese rounds  Low-fat/low-sugar Greek yogurt  Low-fat cottage cheese  Lean deli meat (chicken/turkey/ham)  Roasted chickpeas or lentils  Nuts   Turkey meat stick  Protein shake/bar  \"P3\" snack (cheese, nuts, deli meat)  Aldi's \"Protein Bread\"   \"Egglife\" egg white wrap    Tuna/salmon can/packet     Non-meat protein Ideas  Quinoa  Eggs  Dairy (Cottage cheese, low fat cheese, greek yogurt)   Nuts/Seeds   Beans  Lentils  Protein pasta   Nutritional yeast  Garden of life raw meal powder  Liquid aminos  Homemade meats - a taco meat could be made with chopped cauliflower/mushroom as an example   Hummus (could do homemade if preferred)  Hemp hearts   Tofu  Seitan   Edamame  Tempeh  Chano Seeds    Complex Carbohydrates high in protein  Quinoa  Brown Rice  Chick Pea  Buckwheat  Sweet Potatoes  Lentils  Legumes  Chase Beans  Black Beans   Farro   Kidney Beans   Barley    Follow Up: as needed.     Time spent with patient: 27 minutes.  Veronica Barger RD, LD    "

## 2025-05-14 ENCOUNTER — VIRTUAL VISIT (OUTPATIENT)
Dept: ENDOCRINOLOGY | Facility: CLINIC | Age: 50
End: 2025-05-14
Attending: INTERNAL MEDICINE
Payer: COMMERCIAL

## 2025-05-14 VITALS — BODY MASS INDEX: 44.04 KG/M2 | WEIGHT: 257.94 LBS | HEIGHT: 64 IN

## 2025-05-14 DIAGNOSIS — N18.6 END STAGE RENAL FAILURE ON DIALYSIS (H): ICD-10-CM

## 2025-05-14 DIAGNOSIS — E66.813 CLASS 3 SEVERE OBESITY WITH SERIOUS COMORBIDITY AND BODY MASS INDEX (BMI) OF 45.0 TO 49.9 IN ADULT, UNSPECIFIED OBESITY TYPE (H): ICD-10-CM

## 2025-05-14 DIAGNOSIS — Z99.2 END STAGE RENAL FAILURE ON DIALYSIS (H): ICD-10-CM

## 2025-05-14 DIAGNOSIS — Z71.3 NUTRITIONAL COUNSELING: Primary | ICD-10-CM

## 2025-05-14 ASSESSMENT — PAIN SCALES - GENERAL: PAINLEVEL_OUTOF10: MODERATE PAIN (4)

## 2025-05-14 NOTE — NURSING NOTE
Current patient location: [address] [place] [id number]    Is the patient currently in the state of [place]? YES    Visit mode: VIDEO    If the visit is dropped, the patient can be reconnected by:VIDEO VISIT: Text to cell phone:   Telephone Information:   Mobile [phone]       Will anyone else be joining the visit? NO  (If patient encounters technical issues they should call [phone]    Are changes needed to the allergy or medication list? N/A    Are refills needed on medications prescribed by this physician? NO    Rooming Documentation:  Questionnaire(s) completed    Reason for visit: RECHECK    Pt states 4/10 arthritis chronic pain in knee and back today.    Russell HUMPHREYF

## 2025-05-14 NOTE — LETTER
"5/14/2025       RE: Елена Ko  [address] [place] [id number]     Dear Colleague,    Thank you for referring your patient, Елена Ko, to the [facility] [place] at [facility]. Please see a copy of my visit note below.    Video-Visit Details    Type of service:  Video Visit    Video Start Time: 10:28 AM   Video End Time: 10:55 AM     Originating Location (pt. Location): Home    Distant Location (provider location): Offsite (providers home) [facility] [place]     Platform used for Video Visit: [employer]    Return nutrition consultation note    Reason For Visit: Nutrition Assessment    Елена Ko is a 50 year old presenting today for return nutrition weight management consult.  Patient is accompanied by self.  This is patient's 5th nutrition visit. Saw transplant dietitian on 1/22/24 and completed the WLS nutrition class on 2/16/24.     Pt referred by JAVED Singleton  on January 22, 2024.     CO-MORBIDITIES OF OBESITY INCLUDE:  ESRD on dialysis since August 30, 2023, GERD, HTN    SUPPORT:       No data to display              ANTHROPOMETRICS:    Initial consult weight: 308 lb on 1/22/24   Estimated body mass index is 44.97 kg/m  as calculated from the following:    Height as of this encounter: 1.613 m (5' 3.5\").    Weight as of this encounter: 117 kg (257 lb 15 oz).  Current Weight: 257 lb per pt report, -51 lb (17% of bodyweight)     Needs BMI <40, ideally less than 35 for kidney transplant     MEDICATION FOR WEIGHT LOSS: Phentermine (Dr. Savage)     VITAMIN/MINERAL SUPPLEMENTS: Nephrovite, Vitamin D, Tums, Calcitriol 3x a week.     Is on peritoneal dialysis. Follows the normal dialysis diet recommendations. Potassium level actually runs low often. On a 2 liter fluid restrictions.     NUTRITION HISTORY:  Food allergies: NKFA   Food intolerances: Lactose intolerant - tries to " limit, soy products   Barriers to lifestyle change: hard to plan food around busy life and activities    Works from home   Feels too tired to cook at times but this is getting better. Really trying to listen to her body when she is full to stop eating.     Starts her peritoneal dialysis around 5 or 6 PM - 9 hrs in total.     Has been tracking her nutrition in an phill [employer].     Recent Diet Recall:  Breakfast: skips, not hungry, has never been a breakfast person.   Lunch: 10-11 AM, quick easy meals, leftovers or sandwich   Snack - grapes, cheese   Dinner: popcorn   Beverages: water is main source of hydration, glass of orange juice in the morning. Does not like soda anymore d/t it tasting strange.     April 2024: Last month had a bladder infection and that hindered her her progress she was making while she was recovering.  Last month was also working on getting her potassium back up due to it being low. For physical activity she has been trying to get out and do some walking around the house. Started trying to eat around 9 AM. Today had a bowl of vegetables. Has been eating around 9, 2:30 or 3 PM. Might have a snack. Mainly drinks water, has been adding fruits to her water.     May 2025: Last RD visit was in April 2024. Patient reports she has recently been dealing with some health struggles but is getting better.     Patient has been trying to be more aware and mindful of what she eats. Uses a food tracking phill called [employer]. A lot of the time doesn't eat enough calories. Has been tracking 4 days a week.     Doesn't have much feelings of hunger throughout the day. Starts working around 8:30 AM. Typically eats around 10:30/11 AM, not hungry before that usually. May have a banana earlier in the morning. Tries to be done eating by 3 PM so she isn't uncomfortable during her dialysis. [place] up to peritoneal around 5-6 PM and this runs for 9 hours.     Has been dealing with low hemoglobin, got an iron infusion last  "month.     EXERCISE: Low energy, gets tired easily. Tries to do a little activity with her dogs. Also has a weighted hoop.     ADDITIONAL INFORMATION:  Works as a , has 4 teenage kids.     NUTRITION DIAGNOSIS:  Obesity r/t long history of positive energy balance aeb BMI >30 kg/m2.    INTERVENTION:  Materials/education provided on hypocaloric diet for weight loss.  Reviewed progress towards previous goals.  Praised pt on progress they have made.   Discussed potential barriers and strategies to overcome.   Encouraged increased physical activity  Patient demonstrates understanding.  Co-developed goals to work towards.   Provided pt with list of goals and resources below via [employer].     Expected Engagement: good    GOALS:  1) Wants to be at 230 lb by end of August   2) Aim for 60-90 grams of protein daily. Ideally averaging closer to 90 grams most often.   3) Small frequent meals every couple of hours or two meals + a protein shake or protein bar.   4) Aim for 30 minutes of physical activity daily.     Protein Sources for Weight Loss   [email or web address]?     Quick/Easy Protein Sources:  Hard boiled eggs  Part-skim cheese sticks  Baby Bell cheese rounds  Low-fat/low-sugar Greek yogurt  Low-fat cottage cheese  Lean deli meat (chicken/turkey/ham)  Roasted chickpeas or lentils  Nuts   Turkey meat stick  Protein shake/bar  \"P3\" snack (cheese, nuts, deli meat)  Aldi's \"Protein Bread\"   \"Egglife\" egg white wrap    Tuna/salmon can/packet     Non-meat protein Ideas  Quinoa  Eggs  Dairy (Cottage cheese, low fat cheese, greek yogurt)   Nuts/Seeds   Beans  Lentils  Protein pasta   Nutritional yeast  Garden of life raw meal powder  Liquid aminos  Homemade meats - a taco meat could be made with chopped cauliflower/mushroom as an example   Hummus (could do homemade if preferred)  Hemp hearts   Tofu  Seitan   Edamame  Tempeh  Chano Seeds    Complex Carbohydrates high in protein  Quinoa  Brown Rice  Chick " Pea  Buckwheat  Sweet Potatoes  Lentils  Legumes  Chase Beans  Black Beans   Farro   Kidney Beans   Barley    Follow Up: as needed.     Time spent with patient: 27 minutes.  Veronica Barger RD, LD      Again, thank you for allowing me to participate in the care of your patient.      Sincerely,    Veronica Barger RD

## 2025-05-14 NOTE — PATIENT INSTRUCTIONS
"Henri Christiansen,     Follow-up with RD as needed.     Thank you,    Veronica Barger, DAVID, LD  If you would like to schedule or reschedule an appointment with the RD, please call [phone]    Nutrition Goals  1) Wants to be at 230 lb by end of August   2) Aim for 60-90 grams of protein daily. Ideally averaging closer to 90 grams most often.   3) Small frequent meals every couple of hours or two meals + a protein shake or protein bar.   4) Aim for 30 minutes of physical activity daily.     Protein Sources for Weight Loss   [email or web address]?     Quick/Easy Protein Sources:  Hard boiled eggs  Part-skim cheese sticks  Baby Bell cheese rounds  Low-fat/low-sugar Greek yogurt  Low-fat cottage cheese  Lean deli meat (chicken/turkey/ham)  Roasted chickpeas or lentils  Nuts   Turkey meat stick  Protein shake/bar  \"P3\" snack (cheese, nuts, deli meat)  Aldi's \"Protein Bread\"   \"Egglife\" egg white wrap    Tuna/salmon can/packet     Non-meat protein Ideas  Quinoa  Eggs  Dairy (Cottage cheese, low fat cheese, greek yogurt)   Nuts/Seeds   Beans  Lentils  Protein pasta   Nutritional yeast  Garden of life raw meal powder  Liquid aminos  Homemade meats - a taco meat could be made with chopped cauliflower/mushroom as an example   Hummus (could do homemade if preferred)  Hemp hearts   Tofu  Seitan   Edamame  Tempeh  Chano Seeds    Complex Carbohydrates high in protein  Quinoa  Brown Rice  Chick Pea  Buckwheat  Sweet Potatoes  Lentils  Legumes  Chase Beans  Black Beans   Farro   Kidney Beans   Barley    COMPREHENSIVE WEIGHT MANAGEMENT PROGRAM  VIRTUAL SUPPORT GROUPS    At [facility], our Comprehensive Weight Management program offers on-line support groups for patients who are working on weight loss and considering, preparing for, or have had weight loss surgery.     There is no cost for this opportunity.  You are invited to attend the?Virtual Support Groups?provided by any of the following locations:    [facility] via " [employer] Teams with Joan Leyva RD, RN  2.   [place] via [employer] Teams with Dio Vidales, PhD, LP  3.   [place] via [employer] with Neela Lopez RN  4.   [facility] via a Zoom Meeting with Neela Xavier [facility]-    The following Support Group information can also be found on our website:  [email or web address]      [facility]   WEIGHT LOSS SURGERY SUPPORT GROUP  The support group is a patient-lead forum that meets monthly to share experiences, encouragement and education. It is open to those who have had weight loss surgery, are scheduled for surgery, or are considering surgery.   WHEN: 3rd Wednesday of each month from 5:00PM - 6:00PM using Microsoft Teams.   FACILITATOR: Led by Joan Parmar RD, LD, RN, the program's Clinical Coordinator.   TO REGISTER: Please contact the clinic via [employer] or call the nurse line directly at [phone] to inform our staff that you would like an invite sent to you and to let us know the email you would like the invite sent to. Prior to the meeting, a link with directions on how to join the meeting will be sent to you.    2025 Meetings, 3rd Wednesday, 5:00pm - 6:00pm    January 15: Let's Talk  February19: Let's Talk  March 19: Speaker: Masood Dickey RD, LD  April 16: Let's Talk  May 21: Speaker: Melyssa Arroyo RD, LD  June18: Let's Talk  July 16: Let's Talk  August 20: Let's Talk  September 17: No meeting.  October 15: Speaker: Kathryn Savage PsyD, CHRISTIANO  November 19: Let's Talk  December 17: Let's Talk    [facility] BARIATRIC CARE SUPPORT GROUP  This is open to all pre- and post- operative bariatric surgery patients as well as their support system.   WHEN: 3rd Tuesday of each month from 6:30 PM - 8:00 PM using Microsoft Teams.   FACILITATOR: Led by Dio Vidales, Ph.D who is a Licensed Psychologist with the   [facility] Comprehensive Weight Management Program.   TO REGISTER: Please send an email to Dio Vidales, Ph.D., LP at?[email or web address]?if you would like an invitation to the group. Prior to the meeting, a link with directions on how to join the meeting will be sent to you.    2025 Meetings, 3rd Tuesday January 21st: Open Forum  February 18th: Medications and Bariatric Surgery  Speaker: Liss Fernando, [place] Resident  March 18th: Open Forum  April 15th: Genetics of Obesity as well as Q&A, Speaker: Alesha Arnett MD, [facility] Comprehensive Weight Management Program.  May 20th: Open Forum  June 17th: Nutritional Labeling, Speaker: TBAYLA  July 15th: Open Forum  August 19th: Open Forum  September 16th: Open Forum  October 21st: Open Forum  November 18th: Holiday Eating, Speaker: TBAYLA  December 16th: Open Forum    [facility] POST-OPERATIVE BARIATRIC SURGERY SUPPORT GROUP  This is a support group for [facility] bariatric patients (and those external to [facility]) who have had bariatric surgery and are at least 3 months post-surgery.  WHEN: 4th Thursday of the month from 11:00 AM - 12:00 PM using Microsoft Teams.   FACILITATOR: Led by Certified Bariatric Nurse, Neela Lopez RN, CBN.   TO REGISTER: Please send an email to Neela at [email or web address] if you would like an invitation to the group.  Prior to the meeting, a link with directions on how to join the meeting will be sent to you.    2025 Meetings, 4th Thursday, 11:00am - 12:pm  January 23  February 27  March 27  April 24  May 22  Li 26  July 24  August 28  September 25  October 23  November 27: Thanksgiving Day, No meeting.      December 25: Amina Day, No meeting.        [facility]   HEALTHY LIFESTYLE COACHING GROUP  This is a 60 minute virtual coaching group for those who want to lead a healthier  lifestyle. Come together to set goals and overcome barriers in a supportive group environment. We will address the four pillars of health: nutrition, exercise, sleep, and emotional well-being.   WHEN: 1st Friday of the month, 12:30 PM - 1:30 PM   using a Zoom meeting.     FACILITATOR: Led by National Board-Certified Health and , Neela Xavier, [facility].  TO REGISTER: Please call the [employer] at [phone] to register. You will get an appointment to attend in [email or web address]. Fifteen minutes prior to the meeting, complete the e-check in and you will get the link to join the meeting.  There is no charge to attend this group and space is limited.  Please register for each month you wish to attend    2025 Meetings, 1st Friday, 12:30pm-1:30pm  January 3  February 7  March 7: No meeting.  April 4  May 2  Li 6  July 4: Fourth of July Holiday, No meeting.    August 1  September 5  October 3  November 7  December 5